# Patient Record
Sex: MALE | Race: WHITE | NOT HISPANIC OR LATINO | Employment: FULL TIME | URBAN - METROPOLITAN AREA
[De-identification: names, ages, dates, MRNs, and addresses within clinical notes are randomized per-mention and may not be internally consistent; named-entity substitution may affect disease eponyms.]

---

## 2017-06-30 ENCOUNTER — AMBULATORY - HEALTHEAST (OUTPATIENT)
Dept: NEUROSURGERY | Facility: CLINIC | Age: 37
End: 2017-06-30

## 2017-06-30 DIAGNOSIS — G93.5 CHIARI I MALFORMATION (H): ICD-10-CM

## 2017-07-19 ENCOUNTER — RECORDS - HEALTHEAST (OUTPATIENT)
Dept: ADMINISTRATIVE | Facility: OTHER | Age: 37
End: 2017-07-19

## 2017-08-22 ENCOUNTER — OFFICE VISIT - HEALTHEAST (OUTPATIENT)
Dept: NEUROSURGERY | Facility: CLINIC | Age: 37
End: 2017-08-22

## 2017-08-22 ENCOUNTER — RECORDS - HEALTHEAST (OUTPATIENT)
Dept: RADIOLOGY | Facility: CLINIC | Age: 37
End: 2017-08-22

## 2017-08-22 DIAGNOSIS — G43.909 MIGRAINE: ICD-10-CM

## 2017-08-22 ASSESSMENT — MIFFLIN-ST. JEOR: SCORE: 2180.85

## 2018-03-22 ENCOUNTER — OFFICE VISIT - HEALTHEAST (OUTPATIENT)
Dept: CARDIOLOGY | Facility: CLINIC | Age: 38
End: 2018-03-22

## 2018-03-22 DIAGNOSIS — I10 HTN (HYPERTENSION): ICD-10-CM

## 2018-03-22 DIAGNOSIS — R07.89 CHEST PRESSURE: ICD-10-CM

## 2018-03-22 ASSESSMENT — MIFFLIN-ST. JEOR: SCORE: 2126.42

## 2018-03-28 ENCOUNTER — HOSPITAL ENCOUNTER (OUTPATIENT)
Dept: NUCLEAR MEDICINE | Facility: HOSPITAL | Age: 38
Discharge: HOME | End: 2018-03-28
Attending: INTERNAL MEDICINE

## 2018-03-28 ENCOUNTER — HOSPITAL ENCOUNTER (OUTPATIENT)
Dept: CARDIOLOGY | Facility: HOSPITAL | Age: 38
Discharge: HOME | End: 2018-03-28
Attending: INTERNAL MEDICINE

## 2018-03-28 DIAGNOSIS — R07.89 CHEST PRESSURE: ICD-10-CM

## 2018-03-28 DIAGNOSIS — I10 HTN (HYPERTENSION): ICD-10-CM

## 2018-03-28 LAB
CV STRESS CURRENT BP HE: NORMAL
CV STRESS CURRENT HR HE: 101
CV STRESS CURRENT HR HE: 106
CV STRESS CURRENT HR HE: 111
CV STRESS CURRENT HR HE: 76
CV STRESS CURRENT HR HE: 80
CV STRESS CURRENT HR HE: 83
CV STRESS CURRENT HR HE: 86
CV STRESS CURRENT HR HE: 86
CV STRESS CURRENT HR HE: 91
CV STRESS CURRENT HR HE: 91
CV STRESS CURRENT HR HE: 92
CV STRESS CURRENT HR HE: 95
CV STRESS CURRENT HR HE: 97
CV STRESS DEVIATION TIME HE: NORMAL
CV STRESS ECHO PERCENT HR HE: NORMAL
CV STRESS EXERCISE STAGE HE: NORMAL
CV STRESS FINAL RESTING BP HE: NORMAL
CV STRESS FINAL RESTING HR HE: 91
CV STRESS MAX HR HE: 122
CV STRESS MAX TREADMILL GRADE HE: 0
CV STRESS MAX TREADMILL SPEED HE: 0
CV STRESS PEAK DIA BP HE: NORMAL
CV STRESS PEAK SYS BP HE: NORMAL
CV STRESS PHASE HE: NORMAL
CV STRESS PROTOCOL HE: NORMAL
CV STRESS RESTING PT POSITION HE: NORMAL
CV STRESS ST DEVIATION AMOUNT HE: NORMAL
CV STRESS ST DEVIATION ELEVATION HE: NORMAL
CV STRESS ST EVELATION AMOUNT HE: NORMAL
CV STRESS TEST TYPE HE: NORMAL
CV STRESS TOTAL STAGE TIME MIN 1 HE: NORMAL
NUC STRESS EJECTION FRACTION: 57 %
STRESS ECHO BASELINE BP: NORMAL
STRESS ECHO BASELINE HR: 76
STRESS ECHO CALCULATED PERCENT HR: 67 %
STRESS ECHO LAST STRESS BP: NORMAL
STRESS ECHO LAST STRESS HR: 91

## 2018-12-21 ENCOUNTER — RECORDS - HEALTHEAST (OUTPATIENT)
Dept: ADMINISTRATIVE | Facility: OTHER | Age: 38
End: 2018-12-21

## 2020-07-20 ENCOUNTER — TELEPHONE (OUTPATIENT)
Dept: NEUROLOGY | Facility: CLINIC | Age: 40
End: 2020-07-20

## 2020-07-20 DIAGNOSIS — G43.009 MIGRAINE WITHOUT AURA AND WITHOUT STATUS MIGRAINOSUS, NOT INTRACTABLE: Primary | ICD-10-CM

## 2020-07-20 NOTE — TELEPHONE ENCOUNTER
Please advise   Kenneth Chavez on 7/20/2020 at 11:38 AM    Patient was last seen in December 2018 a year and a half ago  Patient should set up follow-up video follow-up is okay    If still having a prolonged headache that he cannot break could use a short course of prednisone    I will send a dose of prednisone to his pharmacy  Prednisone 10 mg tablet  1 tablet p.o. daily x3 days to help break migraine headache.

## 2020-07-20 NOTE — TELEPHONE ENCOUNTER
Pt left msg stating he has a migraine that he can't get rid off. He'd had for a day and half. He's requesting a call back.

## 2020-08-03 RX ORDER — PREDNISONE 10 MG/1
10 TABLET ORAL DAILY
Qty: 3 TABLET | Refills: 0 | Status: SHIPPED | OUTPATIENT
Start: 2020-08-03 | End: 2020-11-10

## 2020-08-03 NOTE — TELEPHONE ENCOUNTER
Spoke with patient and relayed the below message. He has appt for 8/10/2020 and will be doing video visit   Kenneth Chavez on 8/3/2020 at 12:44 PM

## 2020-08-10 ENCOUNTER — OFFICE VISIT (OUTPATIENT)
Dept: NEUROLOGY | Facility: CLINIC | Age: 40
End: 2020-08-10
Payer: COMMERCIAL

## 2020-08-10 VITALS — BODY MASS INDEX: 29.83 KG/M2 | HEIGHT: 76 IN | WEIGHT: 245 LBS

## 2020-08-10 DIAGNOSIS — G43.009 MIGRAINE WITHOUT AURA AND WITHOUT STATUS MIGRAINOSUS, NOT INTRACTABLE: Primary | ICD-10-CM

## 2020-08-10 PROBLEM — H72.91 TYMPANIC MEMBRANE PERFORATION, RIGHT: Status: ACTIVE | Noted: 2020-03-11

## 2020-08-10 PROBLEM — Z87.891 HISTORY OF TOBACCO ABUSE: Status: ACTIVE | Noted: 2018-06-28

## 2020-08-10 PROBLEM — Z83.49 FAMILY HISTORY OF THYROID DISEASE: Status: ACTIVE | Noted: 2017-04-28

## 2020-08-10 PROBLEM — I10 HYPERTENSION: Status: ACTIVE | Noted: 2020-08-10

## 2020-08-10 PROBLEM — E66.9 OBESITY: Status: ACTIVE | Noted: 2020-08-10

## 2020-08-10 PROBLEM — I10 ESSENTIAL HYPERTENSION: Status: ACTIVE | Noted: 2017-09-15

## 2020-08-10 PROBLEM — D29.4 ANGIOKERATOMA OF SCROTUM: Status: ACTIVE | Noted: 2018-03-28

## 2020-08-10 PROBLEM — F32.89 ATYPICAL DEPRESSIVE DISORDER: Status: ACTIVE | Noted: 2018-03-02

## 2020-08-10 PROBLEM — R51.9 CHRONIC DAILY HEADACHE: Status: ACTIVE | Noted: 2020-08-05

## 2020-08-10 PROBLEM — M51.27 HERNIATED NUCLEUS PULPOSUS, L5-S1: Status: ACTIVE | Noted: 2020-03-05

## 2020-08-10 PROCEDURE — 99214 OFFICE O/P EST MOD 30 MIN: CPT | Mod: GT | Performed by: PSYCHIATRY & NEUROLOGY

## 2020-08-10 RX ORDER — OMEGA-3-ACID ETHYL ESTERS 1 G/1
2 CAPSULE, LIQUID FILLED ORAL DAILY
COMMUNITY

## 2020-08-10 RX ORDER — CYCLOBENZAPRINE HCL 10 MG
1 TABLET ORAL PRN
COMMUNITY
Start: 2019-04-11 | End: 2020-11-10

## 2020-08-10 RX ORDER — ALBUTEROL SULFATE 90 UG/1
1-2 AEROSOL, METERED RESPIRATORY (INHALATION)
COMMUNITY
Start: 2020-05-21

## 2020-08-10 RX ORDER — HYDROXYZINE HYDROCHLORIDE 25 MG/1
25-75 TABLET, FILM COATED ORAL SEE ADMIN INSTRUCTIONS
COMMUNITY
Start: 2020-07-21 | End: 2022-09-12

## 2020-08-10 RX ORDER — SUMATRIPTAN 100 MG/1
100 TABLET, FILM COATED ORAL PRN
COMMUNITY
Start: 2020-03-30 | End: 2020-08-10

## 2020-08-10 RX ORDER — NORTRIPTYLINE HCL 10 MG
40 CAPSULE ORAL SEE ADMIN INSTRUCTIONS
Qty: 360 CAPSULE | Refills: 11 | Status: SHIPPED | OUTPATIENT
Start: 2020-08-10 | End: 2021-07-05

## 2020-08-10 RX ORDER — SUMATRIPTAN 100 MG/1
100 TABLET, FILM COATED ORAL PRN
Qty: 9 TABLET | Refills: 11 | Status: SHIPPED | OUTPATIENT
Start: 2020-08-10 | End: 2021-07-05

## 2020-08-10 RX ORDER — DULOXETIN HYDROCHLORIDE 30 MG/1
30 CAPSULE, DELAYED RELEASE ORAL DAILY
COMMUNITY
Start: 2018-12-18 | End: 2021-07-05

## 2020-08-10 RX ORDER — LORATADINE 10 MG/1
10 TABLET ORAL DAILY
COMMUNITY

## 2020-08-10 RX ORDER — NORTRIPTYLINE HCL 10 MG
10-30 CAPSULE ORAL SEE ADMIN INSTRUCTIONS
COMMUNITY
Start: 2019-12-23 | End: 2020-08-10

## 2020-08-10 RX ORDER — ATENOLOL 25 MG/1
1 TABLET ORAL AT BEDTIME
COMMUNITY
Start: 2020-07-30 | End: 2022-09-12

## 2020-08-10 RX ORDER — DEXAMETHASONE 4 MG/1
1 TABLET ORAL
COMMUNITY
Start: 2020-05-21 | End: 2022-09-12

## 2020-08-10 ASSESSMENT — MIFFLIN-ST. JEOR: SCORE: 2117.81

## 2020-08-10 NOTE — LETTER
"    8/10/2020         RE: Klaus Seaman  [address] [place] [facility] [address]        Dear Colleague,    Thank you for referring your patient, Klaus Seaman, to the [facility] [place]. Please see a copy of my visit note below.    Video follow-up requested by patient  Video follow-up due to the COVID-19 pandemic  Text number [phone]  [employer]  Patient identified    Amwell did not connect well patient was on his cell phone trying to connect    .Patient is being evaluated via a billable video visit. The patient has been notified of following:     \"This video visit will be conducted via a call between you and your physician/provider. We have found that certain health care needs can be provided without the need for an in-person physical exam. This service lets us provide the care you need with a video conversation. If a prescription is necessary we can send it directly to your pharmacy. If lab work is needed we can place an order for that and you can then stop by our lab to have the test done at a later time.    If during the course of the call the physician/provider feels a video visit is not appropriate, you will not be charged for this service.    Physician has received verbal consent for a Video Visit from the patient? YES    Patient would like the video invitation sent by: Text number [phone]    Video Visit Details    Type of service: Video Visit    Video Start Time: 1:30 PM    Video End Time (time video stopped): 2 PM    Originating Location (pt. Location): Patient's Home    Distant Location (provider location): [facility]     Mode of Communication: Video Conference via Rico/ [employer]               Klaus Seaman, a 40-year-old  Reevaluation of chronic migraine headaches  Patient last seen greater than 20 months ago      Patient previously was on metoprolol but had been taken off of that  This summer headache seems of gotten much more severe    Headache " profile  Frequency, headache every day  Duration can last most of the day  Intensity, average from a mild at 1-3 out of 10 mostly, to occasionally been severe 10 out of 10  Missed 2 days of work maybe a week or 2 ago      Had a short prednisone burst that helped but when he was off of it headaches came back    Patient states that he was taken off metoprolol because the blood pressure was elevated just because of the stress    Primary is placed him back on a low-dose of atenolol to see if that would help with his headaches    He does have some asthma and uses inhalers but those are not exacerbated with the beta-blocker    Patient previously was using maybe 1-2 Imitrex per month now was using at least 4 Imitrex per month    Patient feels that when he is having a headache regularly he has difficulty with thinking may be stuttering    Patient states that he has not had this bad a headache since he was a kid maybe 13 or so years old next  With bad headaches he does get photophobia phonophobia hypersensitivity to smell some nausea usually does not have visual changes and does not throw up as trouble concentrating            Neurologic follow-up for:    1. Common migraine.  2. Onset of migraines at the age of 10 to 11 years old.  3. History of getting headaches maybe every five to six times per year in the past.  4. Flurry of headaches for about two weeks, last time I saw him.              No stroke, heart disease, high cholesterol, hypertension, or diabetes in the family.    Family history is also positive for  Maternal grandfather had headaches  Son with Arnold-Chiari malformation  Paternal grandmother with Parkinson s  Mother with arthritis and depression  Father with arthritis        Habits  Patient does smoke some,   Does not drink alcohol.    Medical history:    1. Hypertension, on metoprolol.  2. Common migraine.  3.          Anxiety  4.         Back surgery lumbar sciatica on the right side March 5, 2020  5.          Tympanoplasty  6.         Asthma      Workup in the past included:    1. MRI scan of the head on July 14, 2017, subtle T2 signal changes, otherwise unremarkable similar to 2013, slight increase  in the T2 signal changes.  2. CTA of the head and neck on November 2016, unremarkable.    Patient states that as a teenager had started having headaches, has used sumatriptan in the past, has used Midrin in the past when he was young.    With the bad headaches, he does get photophobia, some phonophobia, and hypersensitive to smells and some nausea, no visual changes, no vomiting.      Review of systems  Frequent headaches as above  Photophobia and phonophobia with the headaches  Not much visual changes  Some difficulty with stuttering of speech when the headaches are bad  No focal weakness  Diplopia dysarthria dysphasia  Difficulty with walking    No chest pain no shortness of breath no nausea vomiting no diarrhea no fever chills no cough no sore throat    Otherwise review systems negative    General exam  HEENT normal  Moving air well not wheezing  Abdomen soft  No edema the feet      Neurologic exam  Alert oriented x3  Normal naming  Normal comprehension  Normal prosody of speech  Normal repetition  No aphasia  No neglect  Normal memory recall    Cranial nerves II through XII  No ophthalmoplegia or nystagmus  Visual fields are intact  Face is symmetrical  Tongue twisters are good    Upper extremities  No drift no tremor normal finger-nose    Lower extremities  Normal strength    Gait  Normal gait    Exam is stable                    We talked about the difference between abortive and preventative medication. We talked about serotonin syndrome, as he is on some medication for his anxiety, nortriptyline, and Imitrex.    Current plan:    1.         Imitrex 100 mg tablet, one tablet p.o. q. day p.r.n. for severe headaches #9 with 11 refills.  2. Primary switched him to atenolol small dose for hypertension, but may help  headache also.  (Seem to do better when he was on the metoprolol 50 mg/day)  3. Nortriptyline, will increase the dose to 40 mg at nighttime using 10 mg tablets  4. Previously tried Topamax, but it made him feel weird.  5. Will follow up in 3 months  6. Talked about good headache hygiene with proper hydration, sleep cycle, eating, and avoiding triggers.    Patient had some response to a short burst of 3 days of prednisone concerned that if this is used too often though he will lose effectiveness    Median a chronic headache rebound phenomenon also we discussed that also    25 minutes total care time today greater than 50% face-to-face with patient discussing above treatment and recommendations      Again, thank you for allowing me to participate in the care of your patient.        Sincerely,        Yandel Ledesma MD

## 2020-08-10 NOTE — NURSING NOTE
Chief Complaint   Patient presents with     Follow Up     Migraines- states he was doing well up until the last month and a half. Since then there has been an increase in frequency where he is getting them more daily and are lasting almost constant. the prednisone burst helped but once he completed the course, the headaches came back      Smart phone [phone] ([employer])   Kenneth Chavez on 8/10/2020 at 1:32 PM

## 2020-08-10 NOTE — PROGRESS NOTES
"Video follow-up requested by patient  Video follow-up due to the COVID-19 pandemic  Text number [phone]  [employer]Peer  Patient identified    Amwell did not connect well patient was on his cell phone trying to connect    .Patient is being evaluated via a billable video visit. The patient has been notified of following:     \"This video visit will be conducted via a call between you and your physician/provider. We have found that certain health care needs can be provided without the need for an in-person physical exam. This service lets us provide the care you need with a video conversation. If a prescription is necessary we can send it directly to your pharmacy. If lab work is needed we can place an order for that and you can then stop by our lab to have the test done at a later time.    If during the course of the call the physician/provider feels a video visit is not appropriate, you will not be charged for this service.    Physician has received verbal consent for a Video Visit from the patient? YES    Patient would like the video invitation sent by: Text number [phone]    Video Visit Details    Type of service: Video Visit    Video Start Time: 1:30 PM    Video End Time (time video stopped): 2 PM    Originating Location (pt. Location): Patient's Home    Distant Location (provider location): [facility] [place]     Mode of Communication: Video Conference via Rico/ [employer] Elianirudh Seaman, a 40-year-old  Reevaluation of chronic migraine headaches  Patient last seen greater than 20 months ago      Patient previously was on metoprolol but had been taken off of that  This summer headache seems of gotten much more severe    Headache profile  Frequency, headache every day  Duration can last most of the day  Intensity, average from a mild at 1-3 out of 10 mostly, to occasionally been severe 10 out of 10  Missed 2 days of work maybe a week or 2 ago      Had a short prednisone burst " that helped but when he was off of it headaches came back    Patient states that he was taken off metoprolol because the blood pressure was elevated just because of the stress    Primary is placed him back on a low-dose of atenolol to see if that would help with his headaches    He does have some asthma and uses inhalers but those are not exacerbated with the beta-blocker    Patient previously was using maybe 1-2 Imitrex per month now was using at least 4 Imitrex per month    Patient feels that when he is having a headache regularly he has difficulty with thinking may be stuttering    Patient states that he has not had this bad a headache since he was a kid maybe 13 or so years old next  With bad headaches he does get photophobia phonophobia hypersensitivity to smell some nausea usually does not have visual changes and does not throw up as trouble concentrating            Neurologic follow-up for:    1. Common migraine.  2. Onset of migraines at the age of 10 to 11 years old.  3. History of getting headaches maybe every five to six times per year in the past.  4. Flurry of headaches for about two weeks, last time I saw him.              No stroke, heart disease, high cholesterol, hypertension, or diabetes in the family.    Family history is also positive for  Maternal grandfather had headaches  Son with Arnold-Chiari malformation  Paternal grandmother with Parkinson s  Mother with arthritis and depression  Father with arthritis        Habits  Patient does smoke some,   Does not drink alcohol.    Medical history:    1. Hypertension, on metoprolol.  2. Common migraine.  3.          Anxiety  4.         Back surgery lumbar sciatica on the right side March 5, 2020  5.         Tympanoplasty  6.         Asthma      Workup in the past included:    1. MRI scan of the head on July 14, 2017, subtle T2 signal changes, otherwise unremarkable similar to 2013, slight increase  in the T2 signal changes.  2. CTA of the head and neck  on November 2016, unremarkable.    Patient states that as a teenager had started having headaches, has used sumatriptan in the past, has used Midrin in the past when he was young.    With the bad headaches, he does get photophobia, some phonophobia, and hypersensitive to smells and some nausea, no visual changes, no vomiting.      Review of systems  Frequent headaches as above  Photophobia and phonophobia with the headaches  Not much visual changes  Some difficulty with stuttering of speech when the headaches are bad  No focal weakness  Diplopia dysarthria dysphasia  Difficulty with walking    No chest pain no shortness of breath no nausea vomiting no diarrhea no fever chills no cough no sore throat    Otherwise review systems negative    General exam  HEENT normal  Moving air well not wheezing  Abdomen soft  No edema the feet      Neurologic exam  Alert oriented x3  Normal naming  Normal comprehension  Normal prosody of speech  Normal repetition  No aphasia  No neglect  Normal memory recall    Cranial nerves II through XII  No ophthalmoplegia or nystagmus  Visual fields are intact  Face is symmetrical  Tongue twisters are good    Upper extremities  No drift no tremor normal finger-nose    Lower extremities  Normal strength    Gait  Normal gait    Exam is stable                    We talked about the difference between abortive and preventative medication. We talked about serotonin syndrome, as he is on some medication for his anxiety, nortriptyline, and Imitrex.    Current plan:    1.         Imitrex 100 mg tablet, one tablet p.o. q. day p.r.n. for severe headaches #9 with 11 refills.  2. Primary switched him to atenolol small dose for hypertension, but may help headache also.  (Seem to do better when he was on the metoprolol 50 mg/day)  3. Nortriptyline, will increase the dose to 40 mg at nighttime using 10 mg tablets  4. Previously tried Topamax, but it made him feel weird.  5. Will follow up in 3  months  6. Talked about good headache hygiene with proper hydration, sleep cycle, eating, and avoiding triggers.    Patient had some response to a short burst of 3 days of prednisone concerned that if this is used too often though he will lose effectiveness    Median a chronic headache rebound phenomenon also we discussed that also    25 minutes total care time today greater than 50% face-to-face with patient discussing above treatment and recommendations

## 2020-08-10 NOTE — PATIENT INSTRUCTIONS
Patient Education     About Migraine Headaches  What is a migraine headache?  A migraine is a special kind of headache. It can last a for hours or days. It may cause intense pain. You may also feel sick to your stomach or have eye problems.  What causes it?  We don't know the exact cause. They may be caused by a problem with blood flow to the brain. Or they may happen when brain chemicals don't stay balanced. You are more likely to get a migraine when:    You are under stress    You are tired    You eat some kinds of foods, such as wine, cheese, chocolate or chemicals added to foods    You are around bright lights  Women are more likely to have migraines than men. Sometimes the headaches happen around the time a woman has her period. Or they may happen when a woman is taking hormone pills.   Migraines can run in families.  What are symptoms?  Before a migraine, you may:    Not feel well    Lose part of your vision    See bright spots    See zigzags in front of your eyes  Most of the time, these problems go away when the headache starts. When you have a migraine, you may:    Have a headache that throbs or pounds (you may feel the pain more on one side of your head, or your whole head may hurt)    Be very sensitive to light    Have blurry vision    Vomit (throw up) or have nausea (feel sick to your stomach)    Have numbness or tingling in your face or arm  How is it diagnosed?  Your care team will ask you about your symptoms and medical history. They will examine you.   It may help to keep a headache diary. You should write down:    The date and time of each headache    How long it lasts    The type of pain (is it dull or sharp? Does it throb? Do you feel pressure?)    Where it hurts (for example, scalp, eyes, temples, neck)    What symptoms you had before the headache started    What you ate and drank before the headache    If you used cigarettes, caffeine, alcohol or soda    What time you went to bed the night  before, and what time you woke up that day    If you are a woman, you should also note if you are using birth control pills or taking other female hormones  If you just recently started having headaches, your care team may suggest tests to look for the causes of your symptoms. You may need a brain scan or MRI.  How is it treated?    You may need to take medicines to keep migraines from getting worse once they start. Take these medicines as soon as you can when you start to have signs of a migraine.    You may need to take another medicine every day to stop migraines from coming so often. The medicine can help prevent very bad migraines.    You may need to try a medicine for a few weeks to see if it works. Be sure to keep your follow-up appointments with your care team to see if a medicine is working and what you should try next. Talk to your care team about what is best for you. You may have many choices.  How long will it last?  The headache may last from a few hours to a few days. You may get migraines for the rest of your life. Most of the time, migraines happen less often as you get older.  How can I take care of myself?  As soon as the migraine starts:    Take a pain reliever. Ask your care team what medicine you should take.    Rest in a quiet, dark room until you start to feel better.    Don't drive a car while you have a migraine.    See your care team if your headaches get worse or if they don't get better when you take medicine for them. It may take a few visits to find the best way to control your headaches.  How can I prevent migraines?    Eat regular, healthy meals. Don't go too long without eating. Stay away from foods that seem to cause headaches. Watch out for:  ? Wine, yenni and beer  ? Cheese  ? Aged, canned and processed meats  ? Bread made with yeast  ? Foods with cheese, chocolate or nuts    Don't use medicines that can cause headaches. Ask your care team about this. You may need to stop using  birth control or hormone pills.    Don't smoke cigarettes    Get plenty of sleep every day    Lower your stress. Find time to relax, rest and have fun in your life.  When should I call my care team?  Call your care team right away if you have symptoms that you don't usually get with migraines or you have other unusual symptoms, such as:    Problems talking or your speech is slurred    A weak arm or leg that you can't move in a normal way    A fever higher than 100 F (37.8 C)    Stiff neck    Vomiting that lasts for a few hours  For more information  National Headache Foundation (NHF)  www.headaches.org.  For informational purposes only. Not to replace the advice of your health care provider.   Copyright   2011 [employer]. All rights reserved. Clinically reviewed by [employer]. Present 213831 - 08/18.  For informational purposes only. Not to replace the advice of your health care provider.  Copyright   2018 [employer]. All rights reserved.

## 2020-11-10 ENCOUNTER — VIRTUAL VISIT (OUTPATIENT)
Dept: NEUROLOGY | Facility: CLINIC | Age: 40
End: 2020-11-10
Payer: COMMERCIAL

## 2020-11-10 VITALS — BODY MASS INDEX: 29.83 KG/M2 | HEIGHT: 76 IN | WEIGHT: 245 LBS

## 2020-11-10 DIAGNOSIS — G43.709 CHRONIC MIGRAINE WITHOUT AURA WITHOUT STATUS MIGRAINOSUS, NOT INTRACTABLE: Primary | ICD-10-CM

## 2020-11-10 DIAGNOSIS — H72.91 TYMPANIC MEMBRANE PERFORATION, RIGHT: ICD-10-CM

## 2020-11-10 DIAGNOSIS — R51.9 CHRONIC DAILY HEADACHE: ICD-10-CM

## 2020-11-10 PROCEDURE — 99213 OFFICE O/P EST LOW 20 MIN: CPT | Mod: 95 | Performed by: PSYCHIATRY & NEUROLOGY

## 2020-11-10 ASSESSMENT — MIFFLIN-ST. JEOR: SCORE: 2117.81

## 2020-11-10 NOTE — PROGRESS NOTES
"Video follow-up requested by patient  Video follow-up due to the COVID-19 pandemic  Text number [phone]  [email or web address]  Patient identified    Amwell did not connect well patient was on his cell phone trying to connect    .Patient is being evaluated via a billable video visit. The patient has been notified of following:     \"This video visit will be conducted via a call between you and your physician/provider. We have found that certain health care needs can be provided without the need for an in-person physical exam. This service lets us provide the care you need with a video conversation. If a prescription is necessary we can send it directly to your pharmacy. If lab work is needed we can place an order for that and you can then stop by our lab to have the test done at a later time.    If during the course of the call the physician/provider feels a video visit is not appropriate, you will not be charged for this service.    Physician has received verbal consent for a Video Visit from the patient? YES    Patient would like the video invitation sent by: Text number [phone]    Video Visit Details    Type of service: Video Visit    Video Start Time: 3 PM     video end time: 3:15 PM    Originating Location (pt. Location): Patient's Home    Distant Location (provider location): [facility] [place]     Mode of Communication: Video Conference via Rico/ Lisset CERVANTES   Klaus Seaman, a 40-year-old  Reevaluation of chronic migraine headaches  Last seen 3 months ago increased his nortriptyline  Doing better  Over the last 3 months healthy 2 headaches  One was maybe half a day the other one was shorter  Imitrex seems to work  Getting good sleep    Did see the ENT doctor has a cyst in his external ear canal on the right is can have surgery November 23  He has had other cysts in the past    Previously patient had been on metoprolol and did better but came off of that earlier in the year and " headaches flared up and we readjusted the nortriptyline as above      Headache profile back in summer 2020  Frequency, headache every day  Duration can last most of the day  Intensity, average from a mild at 1-3 out of 10 mostly, to occasionally been severe 10 out of 10  Missed 2 days of work maybe a week or 2 ago  Had a short prednisone burst that helped but when he was off of it headaches came back  Patient states that he was taken off metoprolol because the blood pressure was elevated just because of the stress  Primary is placed him back on a low-dose of atenolol to see if that would help with his headaches  He does have some asthma and uses inhalers but those are not exacerbated with the beta-blocker  Patient previously was using maybe 1-2 Imitrex per month now was using at least 4 Imitrex per month  Patient feels that when he is having a headache regularly he has difficulty with thinking may be stuttering  Patient states that he has not had this bad a headache since he was a kid maybe 13 or so years old next  With bad headaches he does get photophobia phonophobia hypersensitivity to smell some nausea usually does not have visual changes and does not throw up as trouble concentrating            Neurologic follow-up for:    1. Common migraine.  2. Onset of migraines at the age of 10 to 11 years old.  3. History of getting headaches maybe every five to six times per year in the past.  4. Flurry of headaches for about two weeks, last time I saw him.              No stroke, heart disease, high cholesterol, hypertension, or diabetes in the family.    Family history is also positive for  Maternal grandfather had headaches  Son with Arnold-Chiari malformation  Paternal grandmother with Parkinson s  Mother with arthritis and depression  Father with arthritis        Habits  Patient does smoke some,   Does not drink alcohol.    Medical history:    1. Hypertension, on metoprolol.  2. Common migraine.  3.           Anxiety  4.         Back surgery lumbar sciatica on the right side March 5, 2020  5.         Tympanoplasty  6.         Asthma      Workup in the past included:    1. MRI scan of the head on July 14, 2017, subtle T2 signal changes, otherwise unremarkable similar to 2013, slight increase  in the T2 signal changes.  2. CTA of the head and neck on November 2016, unremarkable.    Patient states that as a teenager had started having headaches, has used sumatriptan in the past, has used Midrin in the past when he was young.    With the bad headaches, he does get photophobia, some phonophobia, and hypersensitive to smells and some nausea, no visual changes, no vomiting.      Review of systems  Frequent headaches as above  Photophobia and phonophobia with the headaches  Not much visual changes  Some difficulty with stuttering of speech when the headaches are bad  No focal weakness  Diplopia dysarthria dysphasia  Difficulty with walking    No chest pain no shortness of breath no nausea vomiting no diarrhea no fever chills no cough no sore throat    Otherwise review systems negative    General exam  HEENT normal  Moving air well not wheezing  Abdomen soft  No edema the feet      Neurologic exam  Alert oriented x3  Normal naming  Normal comprehension  Normal prosody of speech  Normal repetition  No aphasia  No neglect  Normal memory recall    Cranial nerves II through XII  No ophthalmoplegia or nystagmus  Visual fields are intact  Face is symmetrical  Tongue twisters are good    Upper extremities  No drift no tremor normal finger-nose    Lower extremities  Normal strength    Gait  Normal gait    Exam stable                  We discussed  We talked about the difference between abortive and preventative medication. We talked about serotonin syndrome, as he is on some medication for his anxiety, nortriptyline, and Imitrex.    Current plan:    1.         Imitrex 100 mg tablet, one tablet p.o. q. day p.r.n. for severe headaches #9  with 11 refills.  2. Primary switched him to atenolol small dose for hypertension, but may help headache also.  (Seem to do better when he was on the metoprolol 50 mg/day)  3. Nortriptyline, previous visit dose increased to 40 mg at nighttime using 10 mg tablets.  4. Previously tried Topamax, but it made him feel weird.  5. Will follow up in 6 months  6. Talked about good headache hygiene with proper hydration, sleep cycle, eating, and avoiding triggers.    Patient had some response to a short burst of 3 days of prednisone concerned that if this is used too often though he will lose effectiveness    Doing better now    15 minutes discussion time as above  Follow-up in 6 months      25 minutes total care time today greater than 50% face-to-face with patient discussing above treatment and recommendations

## 2020-11-10 NOTE — LETTER
"    11/10/2020         RE: Klaus Seaman  [address]  [place] [address]        Dear Colleague,    Thank you for referring your patient, Klaus Seaman, to the [facility] [place]. Please see a copy of my visit note below.    Video follow-up requested by patient  Video follow-up due to the COVID-19 pandemic  Text number [phone]  [facility]  Patient identified    Amwell did not connect well patient was on his cell phone trying to connect    .Patient is being evaluated via a billable video visit. The patient has been notified of following:     \"This video visit will be conducted via a call between you and your physician/provider. We have found that certain health care needs can be provided without the need for an in-person physical exam. This service lets us provide the care you need with a video conversation. If a prescription is necessary we can send it directly to your pharmacy. If lab work is needed we can place an order for that and you can then stop by our lab to have the test done at a later time.    If during the course of the call the physician/provider feels a video visit is not appropriate, you will not be charged for this service.    Physician has received verbal consent for a Video Visit from the patient? YES    Patient would like the video invitation sent by: Text number [phone]    Video Visit Details    Type of service: Video Visit    Video Start Time: 3 PM     video end time: 3:15 PM    Originating Location (pt. Location): Patient's Home    Distant Location (provider location): [facility] [place]     Mode of Communication: Video Conference via Rico/ L[employer]   Klaus Seaman, a 40-year-old  Reevaluation of chronic migraine headaches  Last seen 3 months ago increased his nortriptyline  Doing better  Over the last 3 months healthy 2 headaches  One was maybe half a day the other one was shorter  Imitrex seems to work  Getting good " sleep    Did see the ENT doctor has a cyst in his external ear canal on the right is can have surgery November 23  He has had other cysts in the past    Previously patient had been on metoprolol and did better but came off of that earlier in the year and headaches flared up and we readjusted the nortriptyline as above      Headache profile back in summer 2020  Frequency, headache every day  Duration can last most of the day  Intensity, average from a mild at 1-3 out of 10 mostly, to occasionally been severe 10 out of 10  Missed 2 days of work maybe a week or 2 ago  Had a short prednisone burst that helped but when he was off of it headaches came back  Patient states that he was taken off metoprolol because the blood pressure was elevated just because of the stress  Primary is placed him back on a low-dose of atenolol to see if that would help with his headaches  He does have some asthma and uses inhalers but those are not exacerbated with the beta-blocker  Patient previously was using maybe 1-2 Imitrex per month now was using at least 4 Imitrex per month  Patient feels that when he is having a headache regularly he has difficulty with thinking may be stuttering  Patient states that he has not had this bad a headache since he was a kid maybe 13 or so years old next  With bad headaches he does get photophobia phonophobia hypersensitivity to smell some nausea usually does not have visual changes and does not throw up as trouble concentrating            Neurologic follow-up for:    1. Common migraine.  2. Onset of migraines at the age of 10 to 11 years old.  3. History of getting headaches maybe every five to six times per year in the past.  4. Flurry of headaches for about two weeks, last time I saw him.              No stroke, heart disease, high cholesterol, hypertension, or diabetes in the family.    Family history is also positive for  Maternal grandfather had headaches  Son with Arnold-Chiari  malformation  Paternal grandmother with Parkinson s  Mother with arthritis and depression  Father with arthritis        Habits  Patient does smoke some,   Does not drink alcohol.    Medical history:    1. Hypertension, on metoprolol.  2. Common migraine.  3.          Anxiety  4.         Back surgery lumbar sciatica on the right side March 5, 2020  5.         Tympanoplasty  6.         Asthma      Workup in the past included:    1. MRI scan of the head on July 14, 2017, subtle T2 signal changes, otherwise unremarkable similar to 2013, slight increase  in the T2 signal changes.  2. CTA of the head and neck on November 2016, unremarkable.    Patient states that as a teenager had started having headaches, has used sumatriptan in the past, has used Midrin in the past when he was young.    With the bad headaches, he does get photophobia, some phonophobia, and hypersensitive to smells and some nausea, no visual changes, no vomiting.      Review of systems  Frequent headaches as above  Photophobia and phonophobia with the headaches  Not much visual changes  Some difficulty with stuttering of speech when the headaches are bad  No focal weakness  Diplopia dysarthria dysphasia  Difficulty with walking    No chest pain no shortness of breath no nausea vomiting no diarrhea no fever chills no cough no sore throat    Otherwise review systems negative    General exam  HEENT normal  Moving air well not wheezing  Abdomen soft  No edema the feet      Neurologic exam  Alert oriented x3  Normal naming  Normal comprehension  Normal prosody of speech  Normal repetition  No aphasia  No neglect  Normal memory recall    Cranial nerves II through XII  No ophthalmoplegia or nystagmus  Visual fields are intact  Face is symmetrical  Tongue twisters are good    Upper extremities  No drift no tremor normal finger-nose    Lower extremities  Normal strength    Gait  Normal gait    Exam stable                  We discussed  We talked about the  difference between abortive and preventative medication. We talked about serotonin syndrome, as he is on some medication for his anxiety, nortriptyline, and Imitrex.    Current plan:    1.         Imitrex 100 mg tablet, one tablet p.o. q. day p.r.n. for severe headaches #9 with 11 refills.  2. Primary switched him to atenolol small dose for hypertension, but may help headache also.  (Seem to do better when he was on the metoprolol 50 mg/day)  3. Nortriptyline, previous visit dose increased to 40 mg at nighttime using 10 mg tablets.  4. Previously tried Topamax, but it made him feel weird.  5. Will follow up in 6 months  6. Talked about good headache hygiene with proper hydration, sleep cycle, eating, and avoiding triggers.    Patient had some response to a short burst of 3 days of prednisone concerned that if this is used too often though he will lose effectiveness    Doing better now    15 minutes discussion time as above  Follow-up in 6 months      25 minutes total care time today greater than 50% face-to-face with patient discussing above treatment and recommendations        Again, thank you for allowing me to participate in the care of your patient.        Sincerely,        Yandel Ledesma MD

## 2021-01-15 ENCOUNTER — HEALTH MAINTENANCE LETTER (OUTPATIENT)
Age: 41
End: 2021-01-15

## 2021-02-02 ENCOUNTER — MYC MEDICAL ADVICE (OUTPATIENT)
Dept: NEUROLOGY | Facility: CLINIC | Age: 41
End: 2021-02-02

## 2021-02-02 DIAGNOSIS — G43.709 CHRONIC MIGRAINE WITHOUT AURA WITHOUT STATUS MIGRAINOSUS, NOT INTRACTABLE: Primary | ICD-10-CM

## 2021-02-02 NOTE — TELEPHONE ENCOUNTER
Dr. Ledesma-  See pt's BridgeX[employer] message and reply via BridgeXs.  Last seen on 11-10-20.  Yamilet Bueno LPN on 2/2/2021 at 8:12 AM

## 2021-02-03 RX ORDER — PREDNISONE 20 MG/1
TABLET ORAL
Qty: 5 TABLET | Refills: 0 | Status: SHIPPED | OUTPATIENT
Start: 2021-02-03 | End: 2022-09-12

## 2021-02-03 NOTE — TELEPHONE ENCOUNTER
I answered the question on my chart    Sent a prescription to the pharmacy listed in the chart    Prednisone 20 mg tablet 1 tab p.o. every morning x5 days    Yandel Ledesma MD on 2/3/2021 at 4:05 PM

## 2021-02-23 RX ORDER — DULOXETIN HYDROCHLORIDE 30 MG/1
CAPSULE, DELAYED RELEASE ORAL
Qty: 90 CAPSULE | Refills: 2 | OUTPATIENT
Start: 2021-02-23

## 2021-03-31 ENCOUNTER — IMMUNIZATION (OUTPATIENT)
Dept: NURSING | Facility: CLINIC | Age: 41
End: 2021-03-31
Payer: COMMERCIAL

## 2021-03-31 PROCEDURE — 91300 PR COVID VAC PFIZER DIL RECON 30 MCG/0.3 ML IM: CPT

## 2021-03-31 PROCEDURE — 0001A PR COVID VAC PFIZER DIL RECON 30 MCG/0.3 ML IM: CPT

## 2021-04-21 ENCOUNTER — IMMUNIZATION (OUTPATIENT)
Dept: NURSING | Facility: CLINIC | Age: 41
End: 2021-04-21
Attending: INTERNAL MEDICINE
Payer: COMMERCIAL

## 2021-04-21 PROCEDURE — 0002A PR COVID VAC PFIZER DIL RECON 30 MCG/0.3 ML IM: CPT

## 2021-04-21 PROCEDURE — 91300 PR COVID VAC PFIZER DIL RECON 30 MCG/0.3 ML IM: CPT

## 2021-05-25 ENCOUNTER — RECORDS - HEALTHEAST (OUTPATIENT)
Dept: ADMINISTRATIVE | Facility: CLINIC | Age: 41
End: 2021-05-25

## 2021-05-27 ENCOUNTER — RECORDS - HEALTHEAST (OUTPATIENT)
Dept: ADMINISTRATIVE | Facility: CLINIC | Age: 41
End: 2021-05-27

## 2021-05-28 ENCOUNTER — RECORDS - HEALTHEAST (OUTPATIENT)
Dept: ADMINISTRATIVE | Facility: CLINIC | Age: 41
End: 2021-05-28

## 2021-05-29 ENCOUNTER — RECORDS - HEALTHEAST (OUTPATIENT)
Dept: ADMINISTRATIVE | Facility: CLINIC | Age: 41
End: 2021-05-29

## 2021-05-31 VITALS — HEIGHT: 76 IN | WEIGHT: 260 LBS | BODY MASS INDEX: 31.66 KG/M2

## 2021-06-01 VITALS — BODY MASS INDEX: 30.2 KG/M2 | WEIGHT: 248 LBS | HEIGHT: 76 IN

## 2021-06-11 NOTE — PROGRESS NOTES
[facility] - brain MRI without contrast prior to appt in clinic with Dr. Tse.  Marquita Kruger, RN, CNRN

## 2021-06-12 NOTE — PROGRESS NOTES
HA not daily but at posterior side of head and nothing helps. HA are non positional. Pt has sensitivity to light, hearing is amplified. Speech is fluent.

## 2021-06-12 NOTE — PROGRESS NOTES
Dear Dr. Gonzales:  I had the pleasure of seeing Klaus Seaman in consultation in my neurosurgery clinic for possible Chiari malformation.  Klaus is a very pleasant right-handed 37-year-old who has a son with a Chiari malformation and he was having similar symptoms so he made an appointment to see me and had an MRI done recently.  MRIs dated 7/14/2017 and does not show any Chiari malformation.  However he does have a few T2 hyperintense foci in the white matter that are nonspecific.  He states that he has headaches that are occipital and almost once a week and they are debilitating and he has had them since high school.  On physical exam patient is in no acute distress  Face is symmetric  Speech is clear fluent and appropriate  Extraocular muscles are intact bilaterally  Tongue and uvula elevated midline  Shoulder shrug is intact bilaterally  Gait is nonantalgic  Tone and strength are appropriate and symmetric in upper and lower extremities    Assessment and plan:  Klaus Seaman very pleasant right-handed 37-year-old with likely migraines.  He does not have any evidence Chiari on his imaging.  I will refer him to my neurology colleagues for headache evaluation and management.  Thank you for giving me the opportunity to see this very pleasant patient.  If you have any questions about Klaus or any othe patients please feel free to contact me.  Of note I spent greater than 15 minutes counseling the patient regarding his pathology and treatment plan.    Jovita Tse MD, FAANS

## 2021-06-16 NOTE — PROGRESS NOTES
[facility] Heart Care Note    Assessment/Plan:    Klaus Seaman is a 37 y.o. old male with:  1. Chest pain - some features atypical but given risk factors would proceed wihtstress imaging in addition to the following:    Hold imitrex to avoid spasm    Stop metoprolol and start carvedilol (watch for shortness of breath)    Start aspirin 325mg daily cont famotadine to protect your stomach    Arrange stress nuclear study to test for severe blockages     Arrange for lipid study with blood test with Dr. Gonzales at [facility]        Dr. Jagdeep Dominguez  [facility]ARE  [phone]  ______________________________________________________________________    Subjective:    I had the opportunity to see Klaus Seaman at the [facility]. Klaus Seaman is a 37 y.o. male with a known history of stopped tob 1 year ago, no DM or elevated cholesterol , HTN on metoprolol, father with MI in late 60's, with chest pain for past week. He also has migrains and took imitrex with chest pain to follow.  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  Going thru a divorce currently with 5 children, ages 11 - 21. No GI/ bleeding, noted his 18 yo daughter has HTN.  On Sunday he had nausea and diaphoresis on Sunday, with dyspnea. Went toe hED and ruled out for MI and sent home.  Pt had normal stress echo at [facility] 5 years ago and noted to have PFO.  No hx of CVA.    ______________________________________________________________________      Review of Systems:   General: WNL  Eyes: WNL  Ears/Nose/Throat: Hearing Loss  Lungs: WNL  Heart: Chest Pain  Stomach: Heartburn  Bladder: WNL  Muscle/Joints: WNL  Skin: WNL  Nervous System: WNL  Mental Health: Depression     Blood: WNL    Problem List:  There is no problem list on file for this patient.    Medical History:  Past Medical History:   Diagnosis Date     Depression      GERD  "(gastroesophageal reflux disease)      Hypertension      Seasonal depression      Suicide attempt      Surgical History:  Past Surgical History:   Procedure Laterality Date     NASAL SEPTUM SURGERY  03/21/2017     WRIST SURGERY       Social History:  No pertinent social hx related to patient's chief complaint other than above in subjective        Family History:  No pertinent family hx related to patient's chief complaint other than above in subjective      Allergies:  Allergies   Allergen Reactions     Codeine Other (See Comments)     Unknown reaction during child campos     Opioids - Morphine Analogues Other (See Comments)     hyper     Sulfa (Sulfonamide Antibiotics) Hives     Sulfasalazine Hives     Varenicline Tartrate Other (See Comments)     Mood changes       Medications:  Current Outpatient Prescriptions   Medication Sig Dispense Refill     famotidine (PEPCID) 20 MG tablet Take 20 mg by mouth.       hydrOXYzine HCl (ATARAX) 25 MG tablet Take 25 mg by mouth at bedtime as needed.  0     loratadine (CLARITIN) 10 mg tablet Take 10 mg by mouth daily.       metoprolol succinate (TOPROL-XL) 50 MG 24 hr tablet Take 50 mg by mouth daily.  3     SUMAtriptan (IMITREX) 100 MG tablet Take 100 mg by mouth daily as needed.       VENTOLIN HFA 90 mcg/actuation inhaler Inhale 1 puff as needed.  3     amLODIPine (NORVASC) 2.5 MG tablet Take 1 tablet by mouth daily  3     butalbital-acetaminophen-caffeine (FIORICET, ESGIC) -40 mg per tablet Take by mouth.       No current facility-administered medications for this visit.        Objective:   Vital signs:  /68 (Patient Site: Left Arm, Patient Position: Sitting, Cuff Size: Adult Large)  Pulse 60  Resp 16  Ht 6' 4\" (1.93 m)  Wt (!) 248 lb (112.5 kg)  BMI 30.19 kg/m2      Physical Exam:    GENERAL APPEARANCE: Alert, cooperative and in no acute distress.  HEENT: No scleral icterus. No Xanthelasma. Oral mucuos membranes pink and moist.  NECK: JVP<6cm. No Hepatojugular " reflux. Thyroid not visualized  CHEST: clear to auscultation  CARDIOVASCULAR: S1, S2 without murmur ,clicks or rubs. Brachial, radial and posterior tibial pulses are intact and symetric. No carotid bruits noted.    ABDOMEN: Nontender. BS+. No bruits.  EXTREMITIES: No cyanosis, clubbing or edema.    Lab Results:  LIPIDS:  No results found for: CHOL  No results found for: HDL  No results found for: LDLCALC  No results found for: TRIG  No components found for: CHOLHDL    BMP:  Lab Results   Component Value Date    CREATININE 0.99 03/18/2018    BUN 8 03/18/2018     03/18/2018    K 3.5 03/18/2018     03/18/2018    CO2 21 (L) 03/18/2018         Jagdeep Dominguez MD  [facility]  [phone]

## 2021-07-03 NOTE — ADDENDUM NOTE
Addendum Note by Johnie Kruger RN at 6/30/2017  2:09 PM     Author: Johnie Kruger RN Service: -- Author Type: Registered Nurse    Filed: 6/30/2017  2:09 PM Encounter Date: 6/30/2017 Status: Signed    : Johnie Kruger RN (Registered Nurse)    Addended by: JOHNIE KRUGER on: 6/30/2017 02:09 PM        Modules accepted: Orders

## 2021-07-05 ENCOUNTER — OFFICE VISIT (OUTPATIENT)
Dept: NEUROLOGY | Facility: CLINIC | Age: 41
End: 2021-07-05
Payer: COMMERCIAL

## 2021-07-05 VITALS
BODY MASS INDEX: 29.83 KG/M2 | HEART RATE: 93 BPM | WEIGHT: 245 LBS | DIASTOLIC BLOOD PRESSURE: 87 MMHG | HEIGHT: 76 IN | SYSTOLIC BLOOD PRESSURE: 141 MMHG

## 2021-07-05 DIAGNOSIS — G43.009 MIGRAINE WITHOUT AURA AND WITHOUT STATUS MIGRAINOSUS, NOT INTRACTABLE: ICD-10-CM

## 2021-07-05 PROCEDURE — 99214 OFFICE O/P EST MOD 30 MIN: CPT | Performed by: PSYCHIATRY & NEUROLOGY

## 2021-07-05 RX ORDER — PHENTERMINE HYDROCHLORIDE 37.5 MG/1
1 TABLET ORAL DAILY
COMMUNITY
Start: 2021-06-24 | End: 2022-09-12

## 2021-07-05 RX ORDER — IBUPROFEN 600 MG/1
TABLET, FILM COATED ORAL
COMMUNITY
Start: 2021-05-30 | End: 2022-09-12

## 2021-07-05 RX ORDER — NORTRIPTYLINE HCL 10 MG
40 CAPSULE ORAL SEE ADMIN INSTRUCTIONS
Qty: 360 CAPSULE | Refills: 3 | Status: SHIPPED | OUTPATIENT
Start: 2021-07-05 | End: 2022-08-16

## 2021-07-05 RX ORDER — DIAZEPAM 2 MG
TABLET ORAL
COMMUNITY
Start: 2020-02-01 | End: 2022-09-12

## 2021-07-05 RX ORDER — CHOLECALCIFEROL (VITAMIN D3) 50 MCG
TABLET ORAL
COMMUNITY
Start: 2021-07-04

## 2021-07-05 RX ORDER — SUMATRIPTAN 100 MG/1
100 TABLET, FILM COATED ORAL PRN
Qty: 9 TABLET | Refills: 11 | Status: SHIPPED | OUTPATIENT
Start: 2021-07-05 | End: 2022-09-12

## 2021-07-05 ASSESSMENT — MIFFLIN-ST. JEOR: SCORE: 2112.81

## 2021-07-05 NOTE — PROGRESS NOTES
HPI  12/18/2018, in person visit  10/20/2020, video visit  11/10/2020, video visit  7 5/3/2021, in person visit      41-year-old followed neurologically for:  Common migraine headache  Onset of migraine age 11-12 years old  Headache frequency 2/month migraines.   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  Stays well-hydrated    Headaches are throbbing sometimes frontal sometimes posterior rarely bitemporal calls those tension-like  Can get some GI distress occasionally from his ibuprofen  Not much photophobia  No significant visual auras  Smells bother him with a headache  And sounds bother him when he has a headache        Headache profile back in summer 2020  Frequency, headache every day  Duration can last most of the day  Intensity, average from a mild at 1-3 out of 10 mostly, to occasionally been severe 10 out of 10  Missed 2 days of work maybe a week or 2 ago  Had a short prednisone burst that helped but when he was off of it headaches came back  Patient states that he was taken off metoprolol because the blood pressure was elevated just because of the stress  Primary is placed him back on a low-dose of atenolol to see if that would help with his headaches  He does have some asthma and uses inhalers but those are not exacerbated with the beta-blocker  Patient previously was using maybe 1-2 Imitrex per month now was using at least 4 Imitrex per month  Patient feels that when he is having a headache regularly he has difficulty with  thinking may be stuttering  Patient states that he has not had this bad a headache since he was a kid maybe 13 or so years old next  With bad headaches he does get photophobia phonophobia hypersensitivity to smell some nausea usually does not have visual changes and does not throw up as trouble concentrating            Neurologic follow-up for:    1. Common migraine.  2. Onset of migraines at the age of 10 to 11 years old.  3. History of getting headaches maybe every five to six times per year in the past.  4. Flurry of headaches for about two weeks, last time I saw him.        Family history is also positive for  Maternal grandfather had headaches  Son with Arnold-Chiari malformation  Paternal grandmother with Parkinson s  Mother with arthritis and depression  Father with arthritis  Daughter with migraine      Habits  Patient does smoke some, half a pack per day we counseled him to stop or at least cut down has trouble stopping  Does not drink alcohol.    Medical history:    1. Hypertension, on atenolol now  2. Common migraine.  3.          Anxiety  4.         Back surgery lumbar sciatica on the right side March 5, 2020  5.         Tympanoplasty  6.         Asthma      Workup in the past included:    1. MRI scan of the head on July 14, 2017, subtle T2 signal changes, otherwise unremarkable similar to 2013, slight increase  in the T2 signal changes.  2. CTA of the head and neck on November 2016, unremarkable.        Exam    Review of system  Headaches as above    No diplopia dysarthria dysphagia  No focal weakness numbness or tingling  No difficulty with gait or balance  No hearing loss  No visual changes    Does get occasional heartburn cautioned him on using too much ibuprofen    Otherwise review systems negative    General exam  HEENT normal  Lungs clear no wheezing  Heart rate regular  Abdomen soft  No edema the feet      Neurologic exam  Alert oriented x3  Normal naming  Normal comprehension  Normal prosody of  speech  Normal repetition  No aphasia  No neglect  Normal memory recall    Cranial nerves II through XII  No ophthalmoplegia or nystagmus  Visual fields are intact  Face is symmetrical  Tongue twisters are good    Upper extremities  No drift no tremor normal finger-nose    Lower extremities  Normal strength    Gait  Normal gait      Assessment/plan    Migraine headaches  Asthma      Current plan:    1.         Imitrex 100 mg tablet, one tablet p.o. q. day p.r.n. for severe headaches #9 with 11 refills.  2. Primary switched him to atenolol small dose for hypertension, but may help headache also.  (Seem to do better when he was on the metoprolol 50 mg/day)  3. Nortriptyline,  40 mg at nighttime using 10 mg tablets.  4. Previously tried Topamax, but it made him feel weird.  5. Will follow up in 1 year  6. Talked about good headache hygiene with proper hydration, sleep cycle, eating, and avoiding triggers.    Patient had some response to a short burst of 3 days of prednisone concerned that if this is used too often though he will lose effectiveness  Last prednisone dose February 2, 2021    We discussed  We talked about the difference between abortive and preventative medication. We talked about serotonin syndrome, as he is on some medication for his anxiety, nortriptyline, and Imitrex.      EMR notes reviewed  Total care time today 38 minutes

## 2021-07-05 NOTE — LETTER
7/5/2021         RE: Klaus Seaman  [address]  [place] [address]        Dear Colleague,    Thank you for referring your patient, Klaus Seaman, to the [facility] [place]. Please see a copy of my visit note below.      HPI  12/18/2018, in person visit  10/20/2020, video visit  11/10/2020, video visit  7 5/3/2021, in person visit      41-year-old followed neurologically for:  Common migraine headache  Onset of migraine age 11-12 years old  Headache frequency 2/month migraines.   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  Stays well-hydrated    Headaches are throbbing sometimes frontal sometimes posterior rarely bitemporal calls those tension-like  Can get some GI distress occasionally from his ibuprofen  Not much photophobia  No significant visual auras  Smells bother him with a headache  And sounds bother him when he has a headache        Headache profile back in summer 2020  Frequency, headache every day  Duration can last most of the day  Intensity, average from a mild at 1-3 out of 10 mostly, to occasionally been severe 10 out of 10  Missed 2 days of work maybe a week or 2 ago  Had a short prednisone burst that helped but when he was off of it headaches came back  Patient states that he was taken off metoprolol because the blood pressure was elevated just because of the stress  Primary is placed him back on a low-dose of atenolol to see if that would help with his headaches  He does have some asthma  and uses inhalers but those are not exacerbated with the beta-blocker  Patient previously was using maybe 1-2 Imitrex per month now was using at least 4 Imitrex per month  Patient feels that when he is having a headache regularly he has difficulty with thinking may be stuttering  Patient states that he has not had this bad a headache since he was a kid maybe 13 or so years old next  With bad headaches he does get photophobia phonophobia hypersensitivity to smell some nausea usually does not have visual changes and does not throw up as trouble concentrating            Neurologic follow-up for:    1. Common migraine.  2. Onset of migraines at the age of 10 to 11 years old.  3. History of getting headaches maybe every five to six times per year in the past.  4. Flurry of headaches for about two weeks, last time I saw him.        Family history is also positive for  Maternal grandfather had headaches  Son with Arnold-Chiari malformation  Paternal grandmother with Parkinson s  Mother with arthritis and depression  Father with arthritis  Daughter with migraine      Habits  Patient does smoke some, half a pack per day we counseled him to stop or at least cut down has trouble stopping  Does not drink alcohol.    Medical history:    1. Hypertension, on atenolol now  2. Common migraine.  3.          Anxiety  4.         Back surgery lumbar sciatica on the right side March 5, 2020  5.         Tympanoplasty  6.         Asthma      Workup in the past included:    1. MRI scan of the head on July 14, 2017, subtle T2 signal changes, otherwise unremarkable similar to 2013, slight increase  in the T2 signal changes.  2. CTA of the head and neck on November 2016, unremarkable.        Exam    Review of system  Headaches as above    No diplopia dysarthria dysphagia  No focal weakness numbness or tingling  No difficulty with gait or balance  No hearing loss  No visual changes    Does get occasional heartburn cautioned him on using too  much ibuprofen    Otherwise review systems negative    General exam  HEENT normal  Lungs clear no wheezing  Heart rate regular  Abdomen soft  No edema the feet      Neurologic exam  Alert oriented x3  Normal naming  Normal comprehension  Normal prosody of speech  Normal repetition  No aphasia  No neglect  Normal memory recall    Cranial nerves II through XII  No ophthalmoplegia or nystagmus  Visual fields are intact  Face is symmetrical  Tongue twisters are good    Upper extremities  No drift no tremor normal finger-nose    Lower extremities  Normal strength    Gait  Normal gait      Assessment/plan    Migraine headaches  Asthma      Current plan:    1.         Imitrex 100 mg tablet, one tablet p.o. q. day p.r.n. for severe headaches #9 with 11 refills.  2. Primary switched him to atenolol small dose for hypertension, but may help headache also.  (Seem to do better when he was on the metoprolol 50 mg/day)  3. Nortriptyline,  40 mg at nighttime using 10 mg tablets.  4. Previously tried Topamax, but it made him feel weird.  5. Will follow up in 1 year  6. Talked about good headache hygiene with proper hydration, sleep cycle, eating, and avoiding triggers.    Patient had some response to a short burst of 3 days of prednisone concerned that if this is used too often though he will lose effectiveness  Last prednisone dose February 2, 2021    We discussed  We talked about the difference between abortive and preventative medication. We talked about serotonin syndrome, as he is on some medication for his anxiety, nortriptyline, and Imitrex.      EMR notes reviewed  Total care time today 38 minutes        Again, thank you for allowing me to participate in the care of your patient.        Sincerely,        Yandel Ledesma MD

## 2021-07-05 NOTE — NURSING NOTE
Chief Complaint   Patient presents with     Migraine     Pt states he has been symptoms of migraine, but no pain.     Yamilet Bueno LPN on 7/5/2021 at 12:46 PM

## 2021-10-24 ENCOUNTER — HEALTH MAINTENANCE LETTER (OUTPATIENT)
Age: 41
End: 2021-10-24

## 2021-11-12 ENCOUNTER — IMMUNIZATION (OUTPATIENT)
Dept: NURSING | Facility: CLINIC | Age: 41
End: 2021-11-12
Payer: COMMERCIAL

## 2021-11-12 PROCEDURE — 0004A PR COVID VAC PFIZER DIL RECON 30 MCG/0.3 ML IM: CPT

## 2021-11-12 PROCEDURE — 91300 PR COVID VAC PFIZER DIL RECON 30 MCG/0.3 ML IM: CPT

## 2022-02-13 ENCOUNTER — HEALTH MAINTENANCE LETTER (OUTPATIENT)
Age: 42
End: 2022-02-13

## 2022-04-10 ENCOUNTER — MYC MEDICAL ADVICE (OUTPATIENT)
Dept: NEUROLOGY | Facility: CLINIC | Age: 42
End: 2022-04-10
Payer: COMMERCIAL

## 2022-04-10 DIAGNOSIS — G43.709 CHRONIC MIGRAINE WITHOUT AURA WITHOUT STATUS MIGRAINOSUS, NOT INTRACTABLE: Primary | ICD-10-CM

## 2022-04-10 NOTE — LETTER
April 12, 2022      Klaus Seaman  [address]  [place] [address]        To Whom It May Concern:    Klaus Seaman is followed neurologically for his migraine headaches.  His headache disorder at times flares and prevents him from being able to work.  Due to a flareup in his headaches he has missed work April 11, 2022 and April 12, 2022.    Okay to return to work April 13, 2022 no restrictions.      Sincerely,    Yandel Ledesma MD

## 2022-04-10 NOTE — LETTER
4/10/2022        RE: Klaus Seaman  [address]  [place] [address]        To Whom It May Concern:     Klaus Seaman is followed neurologically for his migraine headaches.  His headache disorder at times flares and prevents him from being able to work.  Due to a flareup in his headaches he has missed work April 11, 2022, April 12, 2022 and April 13, 2022.     Okay to return to work April 14, 2022 no restrictions      Sincerely,        Yandel Ledesma MD

## 2022-04-11 RX ORDER — PREDNISONE 20 MG/1
TABLET ORAL
Qty: 5 TABLET | Refills: 0 | Status: SHIPPED | OUTPATIENT
Start: 2022-04-11 | End: 2022-09-12

## 2022-04-11 NOTE — TELEPHONE ENCOUNTER
[facility] Call Center    Phone Message    May a detailed message be left on voicemail: yes     Reason for Call: Symptoms or Concerns     If patient has red-flag symptoms, warm transfer to triage line    Current symptom or concern: Migraine, Vertigo, Facial Numbness    Symptoms have been present for:  3-4 day(s)    Has patient previously been seen for this? Yes    By Dr. Ledesma    Are there any new or worsening symptoms? Yes: Pt reports migraine with vertigo and facial numbness. He has tried nortriptyline, SUMAtriptan, ibuprofen, and Tylenol and nothing has helped.    Please call Pt back at [phone] to advise.      Action Taken: Message routed to:  Other: [facility]    Travel Screening: Not Applicable

## 2022-04-11 NOTE — TELEPHONE ENCOUNTER
Let patient know that I sent in a prescription for prednisone 20 g tablet, 1 tab p.o. every morning for 5 days to break severe migraine headache.

## 2022-04-12 ENCOUNTER — TELEPHONE (OUTPATIENT)
Dept: NEUROLOGY | Facility: CLINIC | Age: 42
End: 2022-04-12
Payer: COMMERCIAL

## 2022-04-12 NOTE — TELEPHONE ENCOUNTER
Spoke to pt, he will  work note in the morning.  Note at  for .      Yamilet Bueno LPN on 4/12/2022 at 3:33 PM

## 2022-04-12 NOTE — TELEPHONE ENCOUNTER
[facility]    Phone Message    May a detailed message be left on voicemail: yes     Reason for Call: Other: Pt called to follow up on a [employer] message sent about getting a work note for 04/11-04/13 due to his continuous headache. Please call back with further information.      Action Taken: Message routed to:  [facility]    Travel Screening: Not Applicable

## 2022-04-13 NOTE — TELEPHONE ENCOUNTER
Pt stopped into clinic to  work note. Pt would like work excuse note to be changed to being excused for April 11-13, returning on the 14th.  New letter printed and signed by Dr. Ledesma.  Letter picked up by pt.    Yamilet Bueno LPN on 4/13/2022 at 12:33 PM

## 2022-05-24 ENCOUNTER — TELEPHONE (OUTPATIENT)
Dept: NEUROLOGY | Facility: CLINIC | Age: 42
End: 2022-05-24
Payer: COMMERCIAL

## 2022-05-24 NOTE — TELEPHONE ENCOUNTER
Dr. Ledesma-  Spoke to pt regarding message below.  Pt states over the past couple of weeks, on the weekends only, he has been getting migraines.  He uses the Imitrex and it has been working, just takes a lot longer to help than it used to.   He is still taking Nortriptyline 40mg at bedtime, and continues to take 25mg of atenolol.  Pt wondering if you have any other recommendations?    Yamilet Bueno LPN on 5/24/2022 at 10:39 AM

## 2022-05-24 NOTE — TELEPHONE ENCOUNTER
Make sure that there is not a big difference between caffeine intake during the week and caffeine intake on the weekends.  Also make sure that sleep patterns are somewhat similar on weekdays and weekends.  Make sure that you are staying well-hydrated    Seem that in the past when he was on metoprolol the headaches were under better control.  Question whether he should maybe see his primary and consider switching back to the metoprolol and take it at nighttime which will help both for his headache and his blood pressure control.    Keep follow-up visit (last seen July 2021 almost a year ago)    Yandel Ledesma MD on 5/24/2022 at 10:48 AM

## 2022-05-24 NOTE — TELEPHONE ENCOUNTER
Spoke to pt, relayed Dr. Ledesma recommendations to pt.  Pt verbalizes understanding, no further questions.    Yamilet Bueno LPN on 5/24/2022 at 11:24 AM

## 2022-05-24 NOTE — TELEPHONE ENCOUNTER
Health Call Center    Phone Message    May a detailed message be left on voicemail: yes     Reason for Call: Symptoms or Concerns     If patient has red-flag symptoms, warm transfer to triage line    Current symptom or concern: Migraines are worsening.    Symptoms have been present for:  Coupole week(s)    Has patient previously been seen for this? Yes    By Yandel Ledesma      Are there any new or worsening symptoms? Yes: Patient says his migraines are getting worse and he is having to miss work cause of this. Please call patient back to advise at #[phone]      Action Taken: Message routed to:  Other: [facility]    Travel Screening: Not Applicable

## 2022-08-16 ENCOUNTER — TELEPHONE (OUTPATIENT)
Dept: NEUROLOGY | Facility: CLINIC | Age: 42
End: 2022-08-16

## 2022-08-16 DIAGNOSIS — G43.009 MIGRAINE WITHOUT AURA AND WITHOUT STATUS MIGRAINOSUS, NOT INTRACTABLE: ICD-10-CM

## 2022-08-16 RX ORDER — NORTRIPTYLINE HCL 10 MG
40 CAPSULE ORAL SEE ADMIN INSTRUCTIONS
Qty: 360 CAPSULE | Refills: 0 | Status: SHIPPED | OUTPATIENT
Start: 2022-08-16 | End: 2022-09-12

## 2022-08-16 NOTE — TELEPHONE ENCOUNTER
Health Call Center    Phone Message    May a detailed message be left on voicemail: yes     Reason for Call: Medication Refill Request    Has the patient contacted the pharmacy for the refill? Yes   Name of medication being requested: nortriptyline (PAMELOR) 10 MG capsule  Provider who prescribed the medication: Dr. Ledesma  Pharmacy:[id number] - [place], [place] - [address] [place] [address] & [address]  Date medication is needed: ASAP    Action Taken: Message routed to:  [facility]    Travel Screening: Not Applicable

## 2022-08-16 NOTE — TELEPHONE ENCOUNTER
Refill request for nortriptyline.  Pt has upcoming appt on 9/12/22.  Medication T'd for review and signature      Yamilet Bueno LPN on 8/16/2022 at 4:13 PM

## 2022-09-10 NOTE — PROGRESS NOTES
HPI  12/18/2018, in person visit  10/20/2020, video visit  11/10/2020, video visit  7/ 5/2021, in person visit  9/12/2022      42-year-old followed neurologically for:  Common migraine headache      Patient on atenolol at night for blood pressure/headache through primary MD doing well  Nortriptyline 40 mg at night for migraines    Patient had a flurry of migraines in June due to increased stress now doing better    Imitrex usually helps the headache      A.  Migraine headaches       Common migraine       Onset age 11-12 years old       Sometimes occurring flurries       Daughter with migraines      Patient had some response to a short burst of 3 days of prednisone concerned that if this is used too often though he will lose effectiveness  Last prednisone dose February 2, 2021    Prednisone burst : (Prednisone 20 mg tablet 1 every morning, 3-5 days)  2/2/2021  4/10/2022                            July/2021             9/12/2022  Frequency      2-3/week           once/week                          Mild                     mild                            2/month            once/QOWK                         Moderate              mild    Duration         2 hours                 2 hours      Associated symptoms  Photophobia mild  Nausea  No visual changes  Smells bother him during headache    Abortive therapies  Motrin  Imitrex  Prednisone burst    Preventive therapies  Nortriptyline  Atenolol  Metoprolol  Topamax        Headache profile  Frequency of headaches  Migraine headaches 2/month  Duration of migraines less than 2 hours if he takes the Imitrex  Imitrex 100 mg once per day rates the headache well to get it in early  Mild bitemporal headaches 2-3/week, treated with 4 tabs of Motrin (800 mg total)  Knows good headache hygiene, proper sleep proper nutrition proper hydration and avoiding triggers    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  Stays well-hydrated    Headaches are throbbing sometimes frontal  sometimes posterior rarely bitemporal calls those tension-like  Can get some GI distress occasionally from his ibuprofen  Not much photophobia  No significant visual auras  Smells bother him with a headache  And sounds bother him when he has a headache        Headache profile back in summer 2020  Frequency, headache every day  Duration can last most of the day  Intensity, average from a mild at 1-3 out of 10 mostly, to occasionally been severe 10 out of 10  Missed 2 days of work maybe a week or 2 ago  Had a short prednisone burst that helped but when he was off of it headaches came back  Patient states that he was taken off metoprolol because the blood pressure was elevated just because of the stress  Primary is placed him back on a low-dose of atenolol to see if that would help with his headaches  He does have some asthma and uses inhalers but those are not exacerbated with the beta-blocker  Patient previously was using maybe 1-2 Imitrex per month now was using at least 4 Imitrex per month  Patient feels that when he is having a headache regularly he has difficulty with thinking may be stuttering  Patient states that he has not had this bad a headache since he was a kid maybe 13 or so years old next  With bad headaches he does get photophobia phonophobia hypersensitivity to smell some nausea usually does not have visual changes and does not throw up as trouble concentrating      B.  Hypertension      C.  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      Family history is also positive for  Maternal grandfather had headaches  Son with  "Arnold-Chiari malformation  Paternal grandmother with Parkinson s  Mother with arthritis and depression  Father with arthritis  Daughter with migraine      Workup in the past included:  1. MRI scan of the head on July 14, 2017, subtle T2 signal changes, otherwise unremarkable similar to 2013, slight increase  in the T2 signal changes.  2. CTA of the head and neck on November 2016, unremarkable.            Exam    Review of system  Headaches as above    No diplopia dysarthria dysphagia  No focal weakness numbness or tingling  No difficulty with gait or balance  No hearing loss  No visual changes    Does get occasional heartburn cautioned him on using too much ibuprofen    Otherwise review systems negative    General exam  Blood pressure 113/73, pulse 76  HEENT normal  Lungs clear no wheezing  Heart rate regular  Abdomen soft  No edema the feet      Neurologic exam  Alert oriented x3  Normal naming  Normal comprehension  Normal prosody of speech  Normal repetition  No aphasia  No neglect  Normal memory recall    Cranial nerves II through XII  No ophthalmoplegia or nystagmus  Visual fields are intact  Face is symmetrical  Tongue twisters are good    Upper extremities  No drift no tremor normal finger-nose    Lower extremities  Normal strength    Gait  Normal gait      Assessment/plan    Migraine headaches  Asthma      Assessment/plan    1.    Common migraine          Imitrex 100 mg tablet, one tablet p.o. q. day p.r.n. for severe headaches #9 with 11 refills.                   Nortriptyline 40 mg p.o. nightly (on 10 mg tablets)          Previously tried Topamax but made him feel \"weird\"          On beta-blocker per primary MD for hypertension (atenolol 25 mg once at nighttime)    2.   Hypertension        Treated by primary MD        Primary switched him to atenolol small dose for hypertension, but may help headache also.  (Seem to do better when he was on the metoprolol 50 mg/day)        Patient had some response to a " short burst of 3 days of prednisone concerned that if this is used too often though he will lose effectiveness  Prednisone burst  2/2/2021  4/10/2022        We discussed  We talked about the difference between abortive and preventative medication. We talked about serotonin syndrome, as he is on some medication for his anxiety, nortriptyline, and Imitrex.  We talked about good headache hygiene      Total care time today 32 minutes    Follow-up in 1 year

## 2022-09-12 ENCOUNTER — OFFICE VISIT (OUTPATIENT)
Dept: NEUROLOGY | Facility: CLINIC | Age: 42
End: 2022-09-12
Payer: COMMERCIAL

## 2022-09-12 VITALS
DIASTOLIC BLOOD PRESSURE: 73 MMHG | SYSTOLIC BLOOD PRESSURE: 113 MMHG | BODY MASS INDEX: 29.52 KG/M2 | HEIGHT: 76 IN | HEART RATE: 76 BPM | WEIGHT: 242.4 LBS

## 2022-09-12 DIAGNOSIS — G43.009 MIGRAINE WITHOUT AURA AND WITHOUT STATUS MIGRAINOSUS, NOT INTRACTABLE: ICD-10-CM

## 2022-09-12 PROCEDURE — 99214 OFFICE O/P EST MOD 30 MIN: CPT | Performed by: PSYCHIATRY & NEUROLOGY

## 2022-09-12 RX ORDER — SUMATRIPTAN 100 MG/1
100 TABLET, FILM COATED ORAL PRN
Qty: 9 TABLET | Refills: 11 | Status: SHIPPED | OUTPATIENT
Start: 2022-09-12

## 2022-09-12 RX ORDER — DULOXETIN HYDROCHLORIDE 60 MG/1
CAPSULE, DELAYED RELEASE ORAL
COMMUNITY
Start: 2022-07-23

## 2022-09-12 RX ORDER — NORTRIPTYLINE HCL 10 MG
40 CAPSULE ORAL SEE ADMIN INSTRUCTIONS
Qty: 360 CAPSULE | Refills: 3 | Status: SHIPPED | OUTPATIENT
Start: 2022-09-12

## 2022-09-12 NOTE — LETTER
9/12/2022         RE: Klaus Seaman  [address]  [place] [address]        Dear Colleague,    Thank you for referring your patient, Klaus Seaman, to the [facility] [place]. Please see a copy of my visit note below.      HPI  12/18/2018, in person visit  10/20/2020, video visit  11/10/2020, video visit  7/ 5/2021, in person visit  9/12/2022      42-year-old followed neurologically for:  Common migraine headache      Patient on atenolol at night for blood pressure/headache through primary MD doing well  Nortriptyline 40 mg at night for migraines    Patient had a flurry of migraines in June due to increased stress now doing better    Imitrex usually helps the headache      A.  Migraine headaches       Common migraine       Onset age 11-12 years old       Sometimes occurring flurries       Daughter with migraines      Patient had some response to a short burst of 3 days of prednisone concerned that if this is used too often though he will lose effectiveness  Last prednisone dose February 2, 2021    Prednisone burst : (Prednisone 20 mg tablet 1 every morning, 3-5 days)  2/2/2021  4/10/2022                            July/2021             9/12/2022  Frequency      2-3/week           once/week                          Mild                     mild                            2/month            once/QOWK                         Moderate              mild    Duration         2 hours                 2 hours      Associated symptoms  Photophobia mild  Nausea  No visual changes  Smells bother him during headache    Abortive therapies  Motrin  Imitrex  Prednisone burst    Preventive therapies  Nortriptyline  Atenolol  Metoprolol  Topamax        Headache profile  Frequency of headaches  Migraine headaches 2/month  Duration of migraines less than 2 hours if he takes the Imitrex  Imitrex 100 mg once per day rates the headache well to get it in early  Mild bitemporal headaches 2-3/week, treated with  4 tabs of Motrin (800 mg total)  Knows good headache hygiene, proper sleep proper nutrition proper hydration and avoiding triggers    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  Stays well-hydrated    Headaches are throbbing sometimes frontal sometimes posterior rarely bitemporal calls those tension-like  Can get some GI distress occasionally from his ibuprofen  Not much photophobia  No significant visual auras  Smells bother him with a headache  And sounds bother him when he has a headache        Headache profile back in summer 2020  Frequency, headache every day  Duration can last most of the day  Intensity, average from a mild at 1-3 out of 10 mostly, to occasionally been severe 10 out of 10  Missed 2 days of work maybe a week or 2 ago  Had a short prednisone burst that helped but when he was off of it headaches came back  Patient states that he was taken off metoprolol because the blood pressure was elevated just because of the stress  Primary is placed him back on a low-dose of atenolol to see if that would help with his headaches  He does have some asthma and uses inhalers but those are not exacerbated with the beta-blocker  Patient previously was using maybe 1-2 Imitrex per month now was using at least 4 Imitrex per month  Patient feels that when he is having a headache regularly he has difficulty with thinking may be stuttering  Patient states that he has not had this bad a headache since he was a kid maybe 13 or so years old next  With bad headaches he does get photophobia phonophobia hypersensitivity to smell some nausea usually does not have visual changes and does not throw up as trouble concentrating      B.  Hypertension      C.  Ear surgery on the right at a cyst in the canal had some ear bones removed ENT following hearing        Ear surgery December 2021    Past medical history  Hypertension   Common migraine  Anxiety  Asthma  Tympanoplasty  Back surgery lumbar sciatica on the right side  "March 5, 2020    Habits  Patient does smoke some, half a pack per day we counseled him to stop or at least cut down has trouble stopping  Does not drink alcohol.      Family history is also positive for  Maternal grandfather had headaches  Son with Arnold-Chiari malformation  Paternal grandmother with Parkinson s  Mother with arthritis and depression  Father with arthritis  Daughter with migraine      Workup in the past included:  1. MRI scan of the head on July 14, 2017, subtle T2 signal changes, otherwise unremarkable similar to 2013, slight increase  in the T2 signal changes.  2. CTA of the head and neck on November 2016, unremarkable.            Exam    Review of system  Headaches as above    No diplopia dysarthria dysphagia  No focal weakness numbness or tingling  No difficulty with gait or balance  No hearing loss  No visual changes    Does get occasional heartburn cautioned him on using too much ibuprofen    Otherwise review systems negative    General exam  Blood pressure 113/73, pulse 76  HEENT normal  Lungs clear no wheezing  Heart rate regular  Abdomen soft  No edema the feet      Neurologic exam  Alert oriented x3  Normal naming  Normal comprehension  Normal prosody of speech  Normal repetition  No aphasia  No neglect  Normal memory recall    Cranial nerves II through XII  No ophthalmoplegia or nystagmus  Visual fields are intact  Face is symmetrical  Tongue twisters are good    Upper extremities  No drift no tremor normal finger-nose    Lower extremities  Normal strength    Gait  Normal gait      Assessment/plan    Migraine headaches  Asthma      Assessment/plan    1.    Common migraine          Imitrex 100 mg tablet, one tablet p.o. q. day p.r.n. for severe headaches #9 with 11 refills.                   Nortriptyline 40 mg p.o. nightly (on 10 mg tablets)          Previously tried Topamax but made him feel \"weird\"          On beta-blocker per primary MD for hypertension (atenolol 25 mg once at " nighttime)    2.   Hypertension        Treated by primary MD        Primary switched him to atenolol small dose for hypertension, but may help headache also.  (Seem to do better when he was on the metoprolol 50 mg/day)        Patient had some response to a short burst of 3 days of prednisone concerned that if this is used too often though he will lose effectiveness  Prednisone burst  2/2/2021  4/10/2022        We discussed  We talked about the difference between abortive and preventative medication. We talked about serotonin syndrome, as he is on some medication for his anxiety, nortriptyline, and Imitrex.  We talked about good headache hygiene      Total care time today 32 minutes    Follow-up in 1 year        Again, thank you for allowing me to participate in the care of your patient.        Sincerely,        Yandel Ledesma MD

## 2022-09-12 NOTE — NURSING NOTE
Chief Complaint   Patient presents with     Migraine     Follow up      Phoenix Loo CMA@ on 9/12/2022 at 3:10 PM

## 2022-10-15 ENCOUNTER — HEALTH MAINTENANCE LETTER (OUTPATIENT)
Age: 42
End: 2022-10-15

## 2023-03-26 ENCOUNTER — HEALTH MAINTENANCE LETTER (OUTPATIENT)
Age: 43
End: 2023-03-26

## 2024-05-26 ENCOUNTER — HEALTH MAINTENANCE LETTER (OUTPATIENT)
Age: 44
End: 2024-05-26

## 2024-06-17 ENCOUNTER — APPOINTMENT (OUTPATIENT)
Dept: CT IMAGING | Facility: HOSPITAL | Age: 44
End: 2024-06-17
Attending: STUDENT IN AN ORGANIZED HEALTH CARE EDUCATION/TRAINING PROGRAM
Payer: MEDICAID

## 2024-06-17 ENCOUNTER — HOSPITAL ENCOUNTER (EMERGENCY)
Facility: HOSPITAL | Age: 44
Discharge: HOME | End: 2024-06-18
Attending: STUDENT IN AN ORGANIZED HEALTH CARE EDUCATION/TRAINING PROGRAM | Admitting: STUDENT IN AN ORGANIZED HEALTH CARE EDUCATION/TRAINING PROGRAM
Payer: MEDICAID

## 2024-06-17 DIAGNOSIS — R06.02 SHORTNESS OF BREATH: ICD-10-CM

## 2024-06-17 LAB
ACANTHOCYTES BLD QL SMEAR: NORMAL
ANION GAP SERPL CALCULATED.3IONS-SCNC: 8 MMOL/L (ref 7–15)
AUER BODIES BLD QL SMEAR: NORMAL
BASO STIPL BLD QL SMEAR: NORMAL
BASOPHILS # BLD AUTO: 0.1 10E3/UL (ref 0–0.2)
BASOPHILS NFR BLD AUTO: 1 %
BITE CELLS BLD QL SMEAR: NORMAL
BLISTER CELLS BLD QL SMEAR: NORMAL
BUN SERPL-MCNC: 12.3 MG/DL (ref 6–20)
BURR CELLS BLD QL SMEAR: NORMAL
CALCIUM SERPL-MCNC: 9 MG/DL (ref 8.6–10)
CHLORIDE SERPL-SCNC: 106 MMOL/L (ref 98–107)
CREAT SERPL-MCNC: 1.13 MG/DL (ref 0.67–1.17)
DACRYOCYTES BLD QL SMEAR: NORMAL
DEPRECATED HCO3 PLAS-SCNC: 24 MMOL/L (ref 22–29)
EGFRCR SERPLBLD CKD-EPI 2021: 82 ML/MIN/1.73M2
ELLIPTOCYTES BLD QL SMEAR: NORMAL
EOSINOPHIL # BLD AUTO: 0.2 10E3/UL (ref 0–0.7)
EOSINOPHIL NFR BLD AUTO: 3 %
ERYTHROCYTE [DISTWIDTH] IN BLOOD BY AUTOMATED COUNT: 12.3 % (ref 10–15)
FLUAV RNA SPEC QL NAA+PROBE: NEGATIVE
FLUBV RNA RESP QL NAA+PROBE: NEGATIVE
FRAGMENTS BLD QL SMEAR: NORMAL
GIANT PLATELETS BLD QL SMEAR: NORMAL
GLUCOSE SERPL-MCNC: 96 MG/DL (ref 70–99)
HCT VFR BLD AUTO: 43.7 % (ref 40–53)
HGB BLD-MCNC: 15.2 G/DL (ref 13.3–17.7)
HGB C CRYSTALS: NORMAL
HOWELL-JOLLY BOD BLD QL SMEAR: NORMAL
IMM GRANULOCYTES # BLD: 0 10E3/UL
IMM GRANULOCYTES NFR BLD: 0 %
LYMPHOCYTES # BLD AUTO: 1.8 10E3/UL (ref 0.8–5.3)
LYMPHOCYTES NFR BLD AUTO: 24 %
MCH RBC QN AUTO: 31 PG (ref 26.5–33)
MCHC RBC AUTO-ENTMCNC: 34.8 G/DL (ref 31.5–36.5)
MCV RBC AUTO: 89 FL (ref 78–100)
MONOCYTES # BLD AUTO: 0.6 10E3/UL (ref 0–1.3)
MONOCYTES NFR BLD AUTO: 8 %
NEUTROPHILS # BLD AUTO: 4.8 10E3/UL (ref 1.6–8.3)
NEUTROPHILS NFR BLD AUTO: 65 %
NEUTS HYPERSEG BLD QL SMEAR: NORMAL
NRBC # BLD AUTO: 0 10E3/UL
NRBC BLD AUTO-RTO: 0 /100
PATH REV: NORMAL
PLAT MORPH BLD: NORMAL
PLATELET # BLD AUTO: 156 10E3/UL (ref 150–450)
POLYCHROMASIA BLD QL SMEAR: NORMAL
POTASSIUM SERPL-SCNC: 4 MMOL/L (ref 3.4–5.3)
RBC # BLD AUTO: 4.91 10E6/UL (ref 4.4–5.9)
RBC AGGLUT BLD QL: NORMAL
RBC MORPH BLD: NORMAL
ROULEAUX BLD QL SMEAR: NORMAL
RSV RNA SPEC NAA+PROBE: NEGATIVE
SARS-COV-2 RNA RESP QL NAA+PROBE: NEGATIVE
SICKLE CELLS BLD QL SMEAR: NORMAL
SMUDGE CELLS BLD QL SMEAR: NORMAL
SODIUM SERPL-SCNC: 138 MMOL/L (ref 135–145)
SPHEROCYTES BLD QL SMEAR: NORMAL
STOMATOCYTES BLD QL SMEAR: NORMAL
TARGETS BLD QL SMEAR: NORMAL
TOXIC GRANULES BLD QL SMEAR: NORMAL
TROPONIN T SERPL HS-MCNC: 7 NG/L
VARIANT LYMPHS BLD QL SMEAR: NORMAL
WBC # BLD AUTO: 7.5 10E3/UL (ref 4–11)

## 2024-06-17 PROCEDURE — 87637 SARSCOV2&INF A&B&RSV AMP PRB: CPT | Performed by: STUDENT IN AN ORGANIZED HEALTH CARE EDUCATION/TRAINING PROGRAM

## 2024-06-17 PROCEDURE — 80048 BASIC METABOLIC PNL TOTAL CA: CPT | Performed by: STUDENT IN AN ORGANIZED HEALTH CARE EDUCATION/TRAINING PROGRAM

## 2024-06-17 PROCEDURE — 85025 COMPLETE CBC W/AUTO DIFF WBC: CPT | Performed by: STUDENT IN AN ORGANIZED HEALTH CARE EDUCATION/TRAINING PROGRAM

## 2024-06-17 PROCEDURE — 250N000011 HC RX IP 250 OP 636: Performed by: STUDENT IN AN ORGANIZED HEALTH CARE EDUCATION/TRAINING PROGRAM

## 2024-06-17 PROCEDURE — 93005 ELECTROCARDIOGRAM TRACING: CPT | Performed by: STUDENT IN AN ORGANIZED HEALTH CARE EDUCATION/TRAINING PROGRAM

## 2024-06-17 PROCEDURE — 84484 ASSAY OF TROPONIN QUANT: CPT | Performed by: STUDENT IN AN ORGANIZED HEALTH CARE EDUCATION/TRAINING PROGRAM

## 2024-06-17 PROCEDURE — 71275 CT ANGIOGRAPHY CHEST: CPT

## 2024-06-17 PROCEDURE — 99285 EMERGENCY DEPT VISIT HI MDM: CPT | Mod: 25

## 2024-06-17 PROCEDURE — 36415 COLL VENOUS BLD VENIPUNCTURE: CPT | Performed by: STUDENT IN AN ORGANIZED HEALTH CARE EDUCATION/TRAINING PROGRAM

## 2024-06-17 RX ORDER — IOPAMIDOL 755 MG/ML
90 INJECTION, SOLUTION INTRAVASCULAR ONCE
Status: COMPLETED | OUTPATIENT
Start: 2024-06-18 | End: 2024-06-17

## 2024-06-17 RX ADMIN — IOPAMIDOL 90 ML: 755 INJECTION, SOLUTION INTRAVENOUS at 23:52

## 2024-06-17 ASSESSMENT — ACTIVITIES OF DAILY LIVING (ADL)
ADLS_ACUITY_SCORE: 33
ADLS_ACUITY_SCORE: 35

## 2024-06-17 NOTE — Clinical Note
Klaus Seaman was seen and treated in our emergency department on 6/17/2024.  He may return to work on 06/20/2024.       If you have any questions or concerns, please don't hesitate to call.      Chandrakant Manzo MD

## 2024-06-18 VITALS
WEIGHT: 242 LBS | HEIGHT: 76 IN | BODY MASS INDEX: 29.47 KG/M2 | TEMPERATURE: 98.2 F | HEART RATE: 82 BPM | RESPIRATION RATE: 23 BRPM | OXYGEN SATURATION: 97 % | DIASTOLIC BLOOD PRESSURE: 89 MMHG | SYSTOLIC BLOOD PRESSURE: 139 MMHG

## 2024-06-18 LAB
ATRIAL RATE - MUSE: 84 BPM
DIASTOLIC BLOOD PRESSURE - MUSE: NORMAL MMHG
HOLD SPECIMEN: NORMAL
INTERPRETATION ECG - MUSE: NORMAL
P AXIS - MUSE: 64 DEGREES
PR INTERVAL - MUSE: 158 MS
QRS DURATION - MUSE: 118 MS
QT - MUSE: 362 MS
QTC - MUSE: 427 MS
R AXIS - MUSE: 55 DEGREES
SYSTOLIC BLOOD PRESSURE - MUSE: NORMAL MMHG
T AXIS - MUSE: 54 DEGREES
VENTRICULAR RATE- MUSE: 84 BPM

## 2024-06-18 NOTE — DISCHARGE INSTRUCTIONS
Your workup in the emergency department was reassuring, but if your symptoms persist, you may need further workup with your primary care provider.  Please call them in the morning to discuss.    Return to the emergency department if your symptoms worsen.

## 2024-06-18 NOTE — ED TRIAGE NOTES
Over the past 4 days pt has experienced shortness of breath and dizziness (like he is going to pass out) Intermittent but worsening over the past 4 days.

## 2024-06-18 NOTE — ED PROVIDER NOTES
EMERGENCY DEPARTMENT ENCOUNTER      NAME: Klaus Seaman  AGE: 44 year old male  YOB: 1980  MRN: [id number]  EVALUATION DATE & TIME: 6/17/2024  9:56 PM    PCP: Vivian Gonzales    ED PROVIDER: Chandrakant Manzo MD      Chief Complaint   Patient presents with    Shortness of Breath    Dizziness         FINAL IMPRESSION:  1. Shortness of breath          ED COURSE & MEDICAL DECISION MAKING:    Pertinent Labs & Imaging studies reviewed. (See chart for details)  44 year old male presents to the Emergency Department for evaluation of SOB    ED Course as of 06/18/24 0023   Mon Jun 17, 2024 2212 Patient is a 44-year-old male with a past medical history significant for smoking (quit in October), hypertension, significant family history of cardiac disease, and works as a long-haul .  4 days ago he has been experiencing constant shortness of breath, intermittent lightheadedness with exertion and bending over, and left-sided chest pain.  He is in no acute distress on exam, but does have mild tachycardia on arrival.  Exam is reassuring, without wheezes.  Differential diagnosis includes pulmonary embolism, ACS, anemia, pneumonia.   2235 EKG is sinus rhythm at a rate of 84, no ST segment changes indicative of emergent ischemia.   Tue Jun 18, 2024   0004 Troponin normal, and given duration of symptoms does not warrant repeating.  Viral swabs negative.  No electrolyte abnormalities or kidney injury.  No leukocytosis or anemia.   0020 No acute pathology on CT PE.  Will discharge at this time with return precautions and instructions to follow-up with his PCP.       Medical Decision Making  Obtained supplemental history:Supplemental history obtained?: No  Reviewed external records: External records reviewed?: No  Care impacted by chronic illness:Hyperlipidemia and Hypertension  Care significantly affected by social determinants of health:N/A  Did you consider but not order tests?: Work up considered but not performed  and documented in chart, if applicable  Did you interpret images independently?: Independent interpretation of ECG and images noted in documentation, when applicable.  Consultation discussion with other provider:Did you involve another provider (consultant, , pharmacy, etc.)?: No  Discharge. No recommendations on prescription strength medication(s). See documentation for any additional details.        At the conclusion of the encounter I discussed the results of all of the tests and the disposition. The questions were answered. The patient or family acknowledged understanding and was agreeable with the care plan.     0 minutes of critical care time     MEDICATIONS GIVEN IN THE EMERGENCY:  Medications   iopamidol (ISOVUE-370) solution 90 mL (90 mLs Intravenous $Given 6/17/24 5563)       NEW PRESCRIPTIONS STARTED AT TODAY'S ER VISIT  New Prescriptions    No medications on file          =================================================================    HPI    Patient information was obtained from: The patient     Use of : N/A         Klaus Seaman is a 44 year old male with a pertinent history of HTN and HLD who presents to this ED by private vehicle for evaluation of shortness of breath and dizziness.     The patient has had 4 days of shortness of breath and dizziness. He notes that he had a sudden onset of symptoms when sitting watching television. He has since had constant shortness of breath and intermittent dizziness. He has had room spinning dizziness when he squats down or turns his head quickly. He is currently not dizzy. He also endorses intermittent sharp armpit pain. He has had a slight dry cough. He denies any other chest pain, wheezing, leg swelling, or fever. He denies any recent surgery, hospitalization, cancer treatment, or travel. He has no history of blood clots. 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       PAST MEDICAL HISTORY:  Past Medical History:    Diagnosis Date    CTS (carpal tunnel syndrome)     Hyperlipidemia     Hypertension     Migraines        PAST SURGICAL HISTORY:  Past Surgical History:   Procedure Laterality Date    BACK SURGERY      ENT SURGERY      NASAL SEPTUM SURGERY  03/21/2017    SOFT TISSUE SURGERY      WRIST SURGERY             CURRENT MEDICATIONS:    albuterol (PROAIR HFA/PROVENTIL HFA/VENTOLIN HFA) 108 (90 Base) MCG/ACT inhaler  atenolol (TENORMIN) 25 MG tablet  DULoxetine (CYMBALTA) 60 MG capsule  loratadine (CLARITIN) 10 MG tablet  nortriptyline (PAMELOR) 10 MG capsule  omega-3 acid ethyl esters (LOVAZA) 1 g capsule  SUMAtriptan (IMITREX) 100 MG tablet  vitamin B-12 (CYANOCOBALAMIN) 2000 MCG tablet  vitamin D3 (CHOLECALCIFEROL) 50 mcg (2000 units) tablet        ALLERGIES:  Allergies   Allergen Reactions    Sulfasalazine Hives    Codeine Other (See Comments)     Childhood  Unable to sleep  Unknown reaction during child campos      Hydrocodone Other (See Comments)     Mood change - angry    Hydrocodone-Acetaminophen      Other reaction(s): *Unknown  Mood swings    Morphine Nausea and Vomiting    Sulfa Antibiotics Hives       FAMILY HISTORY:  Family History   Problem Relation Age of Onset    Diabetes Mother     Lupus Mother     Back Pain Mother     Hypertension Father     Hyperlipidemia Father     Heart Surgery Father     Cancer Father     Coronary Artery Disease Father     Coronary Stenting Father        SOCIAL HISTORY:   Social History     Socioeconomic History    Marital status:    Tobacco Use    Smoking status: Every Day     Current packs/day: 0.50     Average packs/day: 0.5 packs/day for 22.0 years (11.0 ttl pk-yrs)     Types: Cigarettes    Smokeless tobacco: Never   Substance and Sexual Activity    Alcohol use: Yes     Comment: very rare     Drug use: Never    Sexual activity: Not Currently   Other Topics Concern    Parent/sibling w/ CABG, MI or angioplasty before 65F 55M? No       VITALS:  /89   Pulse 83   Temp 98.2  " F (36.8  C)   Resp 22   Ht 1.93 m (6' 4\")   Wt 109.8 kg (242 lb)   SpO2 97%   BMI 29.46 kg/m      PHYSICAL EXAM    Physical Exam  Vitals and nursing note reviewed.   Constitutional:       General: He is not in acute distress.     Appearance: Normal appearance. He is normal weight. He is not ill-appearing.   HENT:      Head: Normocephalic and atraumatic.      Nose: Nose normal.      Mouth/Throat:      Mouth: Mucous membranes are moist.      Pharynx: Oropharynx is clear.   Eyes:      Extraocular Movements: Extraocular movements intact.      Conjunctiva/sclera: Conjunctivae normal.      Pupils: Pupils are equal, round, and reactive to light.   Cardiovascular:      Rate and Rhythm: Regular rhythm. Tachycardia present.      Pulses: Normal pulses.      Heart sounds: Normal heart sounds. No murmur heard.  Pulmonary:      Effort: Pulmonary effort is normal. No respiratory distress.      Breath sounds: Normal breath sounds.   Abdominal:      General: Abdomen is flat. There is no distension.      Palpations: Abdomen is soft.      Tenderness: There is no abdominal tenderness.   Musculoskeletal:         General: Normal range of motion.      Cervical back: Normal range of motion.      Right lower leg: No edema.      Left lower leg: No edema.   Skin:     General: Skin is warm and dry.      Capillary Refill: Capillary refill takes less than 2 seconds.      Coloration: Skin is not pale.   Neurological:      General: No focal deficit present.      Mental Status: He is alert and oriented to person, place, and time. Mental status is at baseline.   Psychiatric:         Mood and Affect: Mood normal.         Behavior: Behavior normal.         Thought Content: Thought content normal.         Judgment: Judgment normal.            LAB:  All pertinent labs reviewed and interpreted.  Results for orders placed or performed during the hospital encounter of 06/17/24   CT Chest Pulmonary Embolism w Contrast    Impression    IMPRESSION:  1.  " There is no pulmonary embolus, aortic aneurysm or dissection. No acute abnormality.   Basic metabolic panel   Result Value Ref Range    Sodium 138 135 - 145 mmol/L    Potassium 4.0 3.4 - 5.3 mmol/L    Chloride 106 98 - 107 mmol/L    Carbon Dioxide (CO2) 24 22 - 29 mmol/L    Anion Gap 8 7 - 15 mmol/L    Urea Nitrogen 12.3 6.0 - 20.0 mg/dL    Creatinine 1.13 0.67 - 1.17 mg/dL    GFR Estimate 82 >60 mL/min/1.73m2    Calcium 9.0 8.6 - 10.0 mg/dL    Glucose 96 70 - 99 mg/dL   Result Value Ref Range    Troponin T, High Sensitivity 7 <=22 ng/L   Symptomatic Influenza A/B, RSV, & SARS-CoV2 PCR (COVID-19) Nasopharyngeal    Specimen: Nasopharyngeal; Swab   Result Value Ref Range    Influenza A PCR Negative Negative    Influenza B PCR Negative Negative    RSV PCR Negative Negative    SARS CoV2 PCR Negative Negative   CBC with platelets and differential   Result Value Ref Range    WBC Count 7.5 4.0 - 11.0 10e3/uL    RBC Count 4.91 4.40 - 5.90 10e6/uL    Hemoglobin 15.2 13.3 - 17.7 g/dL    Hematocrit 43.7 40.0 - 53.0 %    MCV 89 78 - 100 fL    MCH 31.0 26.5 - 33.0 pg    MCHC 34.8 31.5 - 36.5 g/dL    RDW 12.3 10.0 - 15.0 %    Platelet Count 156 150 - 450 10e3/uL    % Neutrophils 65 %    % Lymphocytes 24 %    % Monocytes 8 %    % Eosinophils 3 %    % Basophils 1 %    % Immature Granulocytes 0 %    NRBCs per 100 WBC 0 <1 /100    Absolute Neutrophils 4.8 1.6 - 8.3 10e3/uL    Absolute Lymphocytes 1.8 0.8 - 5.3 10e3/uL    Absolute Monocytes 0.6 0.0 - 1.3 10e3/uL    Absolute Eosinophils 0.2 0.0 - 0.7 10e3/uL    Absolute Basophils 0.1 0.0 - 0.2 10e3/uL    Absolute Immature Granulocytes 0.0 <=0.4 10e3/uL    Absolute NRBCs 0.0 10e3/uL   RBC and Platelet Morphology   Result Value Ref Range    RBC Morphology Confirmed RBC Indices     Platelet Assessment  Automated Count Confirmed. Platelet morphology is normal.     Automated Count Confirmed. Platelet morphology is normal.    Giant Platelets      Acanthocytes      Hang Rods      Basophilic  Stippling      Bite Cells      Blister Cells      [place] Cells      Elliptocytes      Hgb C Crystals      Jeronimo-Jolly Bodies      Hypersegmented Neutrophils      Polychromasia      RBC agglutination      RBC Fragments      Reactive Lymphocytes      Rouleaux      Sickle Cells      Smudge Cells      Spherocytes      Stomatocytes      Target Cells      Teardrop Cells      Toxic Neutrophils      Pathologist Review Comments (Blood)     Extra Purple Top Tube   Result Value Ref Range    Hold Specimen JIC        RADIOLOGY:  Reviewed all pertinent imaging. Please see official radiology report.  CT Chest Pulmonary Embolism w Contrast   Final Result   IMPRESSION:   1.  There is no pulmonary embolus, aortic aneurysm or dissection. No acute abnormality.          PROCEDURES:   None      [facility] Documentation:   CMS Diagnoses:               I, Katie Naqvi, am serving as a scribe to document services personally performed by Chandrakant Manzo MD based on my observation and the provider's statements to me. I, Chandrakant Manzo MD, attest that Katie Naqvi is acting in a scribe capacity, has observed my performance of the services and has documented them in accordance with my direction.    Chandrakant Manzo MD  [facility] EMERGENCY DEPARTMENT  [address] [place] [id number]  [phone]       Chandrakant Manzo MD  06/18/24 0023

## 2024-07-25 ENCOUNTER — APPOINTMENT (OUTPATIENT)
Dept: CT IMAGING | Facility: HOSPITAL | Age: 44
End: 2024-07-25
Attending: EMERGENCY MEDICINE
Payer: MEDICAID

## 2024-07-25 ENCOUNTER — HOSPITAL ENCOUNTER (EMERGENCY)
Facility: HOSPITAL | Age: 44
Discharge: HOME | End: 2024-07-25
Attending: EMERGENCY MEDICINE | Admitting: EMERGENCY MEDICINE
Payer: MEDICAID

## 2024-07-25 VITALS
HEART RATE: 69 BPM | RESPIRATION RATE: 16 BRPM | HEIGHT: 76 IN | SYSTOLIC BLOOD PRESSURE: 108 MMHG | OXYGEN SATURATION: 96 % | DIASTOLIC BLOOD PRESSURE: 76 MMHG | BODY MASS INDEX: 29.83 KG/M2 | TEMPERATURE: 98 F | WEIGHT: 245 LBS

## 2024-07-25 DIAGNOSIS — R10.84 GENERALIZED ABDOMINAL PAIN: ICD-10-CM

## 2024-07-25 DIAGNOSIS — R19.7 DIARRHEA, UNSPECIFIED TYPE: ICD-10-CM

## 2024-07-25 LAB
ALBUMIN SERPL BCG-MCNC: 4.3 G/DL (ref 3.5–5.2)
ALP SERPL-CCNC: 82 U/L (ref 40–150)
ALT SERPL W P-5'-P-CCNC: 28 U/L (ref 0–70)
ANION GAP SERPL CALCULATED.3IONS-SCNC: 10 MMOL/L (ref 7–15)
AST SERPL W P-5'-P-CCNC: 19 U/L (ref 0–45)
BASOPHILS # BLD AUTO: 0.1 10E3/UL (ref 0–0.2)
BASOPHILS NFR BLD AUTO: 1 %
BILIRUB DIRECT SERPL-MCNC: <0.2 MG/DL (ref 0–0.3)
BILIRUB SERPL-MCNC: 0.6 MG/DL
BUN SERPL-MCNC: 11 MG/DL (ref 6–20)
CALCIUM SERPL-MCNC: 9.2 MG/DL (ref 8.8–10.4)
CHLORIDE SERPL-SCNC: 108 MMOL/L (ref 98–107)
CREAT SERPL-MCNC: 1.1 MG/DL (ref 0.67–1.17)
EGFRCR SERPLBLD CKD-EPI 2021: 84 ML/MIN/1.73M2
EOSINOPHIL # BLD AUTO: 0.2 10E3/UL (ref 0–0.7)
EOSINOPHIL NFR BLD AUTO: 3 %
ERYTHROCYTE [DISTWIDTH] IN BLOOD BY AUTOMATED COUNT: 12.8 % (ref 10–15)
GLUCOSE SERPL-MCNC: 127 MG/DL (ref 70–99)
HCO3 SERPL-SCNC: 23 MMOL/L (ref 22–29)
HCT VFR BLD AUTO: 49.3 % (ref 40–53)
HGB BLD-MCNC: 16.8 G/DL (ref 13.3–17.7)
HOLD SPECIMEN: NORMAL
IMM GRANULOCYTES # BLD: 0 10E3/UL
IMM GRANULOCYTES NFR BLD: 0 %
LIPASE SERPL-CCNC: 130 U/L (ref 13–60)
LYMPHOCYTES # BLD AUTO: 1.9 10E3/UL (ref 0.8–5.3)
LYMPHOCYTES NFR BLD AUTO: 27 %
MAGNESIUM SERPL-MCNC: 2.2 MG/DL (ref 1.7–2.3)
MCH RBC QN AUTO: 30.7 PG (ref 26.5–33)
MCHC RBC AUTO-ENTMCNC: 34.1 G/DL (ref 31.5–36.5)
MCV RBC AUTO: 90 FL (ref 78–100)
MONOCYTES # BLD AUTO: 0.5 10E3/UL (ref 0–1.3)
MONOCYTES NFR BLD AUTO: 6 %
NEUTROPHILS # BLD AUTO: 4.4 10E3/UL (ref 1.6–8.3)
NEUTROPHILS NFR BLD AUTO: 62 %
NRBC # BLD AUTO: 0 10E3/UL
NRBC BLD AUTO-RTO: 0 /100
PLATELET # BLD AUTO: 227 10E3/UL (ref 150–450)
POTASSIUM SERPL-SCNC: 4.1 MMOL/L (ref 3.4–5.3)
PROT SERPL-MCNC: 6.7 G/DL (ref 6.4–8.3)
RBC # BLD AUTO: 5.47 10E6/UL (ref 4.4–5.9)
SODIUM SERPL-SCNC: 141 MMOL/L (ref 135–145)
TSH SERPL DL<=0.005 MIU/L-ACNC: 0.65 UIU/ML (ref 0.3–4.2)
WBC # BLD AUTO: 7 10E3/UL (ref 4–11)

## 2024-07-25 PROCEDURE — 83735 ASSAY OF MAGNESIUM: CPT | Performed by: EMERGENCY MEDICINE

## 2024-07-25 PROCEDURE — 83690 ASSAY OF LIPASE: CPT | Performed by: EMERGENCY MEDICINE

## 2024-07-25 PROCEDURE — 99285 EMERGENCY DEPT VISIT HI MDM: CPT | Mod: 25

## 2024-07-25 PROCEDURE — 250N000011 HC RX IP 250 OP 636: Performed by: EMERGENCY MEDICINE

## 2024-07-25 PROCEDURE — 84443 ASSAY THYROID STIM HORMONE: CPT | Performed by: EMERGENCY MEDICINE

## 2024-07-25 PROCEDURE — 74177 CT ABD & PELVIS W/CONTRAST: CPT

## 2024-07-25 PROCEDURE — 36415 COLL VENOUS BLD VENIPUNCTURE: CPT | Performed by: EMERGENCY MEDICINE

## 2024-07-25 PROCEDURE — 80076 HEPATIC FUNCTION PANEL: CPT | Performed by: EMERGENCY MEDICINE

## 2024-07-25 PROCEDURE — 80048 BASIC METABOLIC PNL TOTAL CA: CPT | Performed by: EMERGENCY MEDICINE

## 2024-07-25 PROCEDURE — 85025 COMPLETE CBC W/AUTO DIFF WBC: CPT | Performed by: EMERGENCY MEDICINE

## 2024-07-25 RX ORDER — IOPAMIDOL 755 MG/ML
90 INJECTION, SOLUTION INTRAVASCULAR ONCE
Status: COMPLETED | OUTPATIENT
Start: 2024-07-25 | End: 2024-07-25

## 2024-07-25 RX ADMIN — IOPAMIDOL 90 ML: 755 INJECTION, SOLUTION INTRAVENOUS at 21:22

## 2024-07-25 ASSESSMENT — ENCOUNTER SYMPTOMS
FEVER: 0
VOMITING: 0
ABDOMINAL PAIN: 1
DIARRHEA: 1
COUGH: 0
SHORTNESS OF BREATH: 0
NAUSEA: 0
FREQUENCY: 0

## 2024-07-25 ASSESSMENT — ACTIVITIES OF DAILY LIVING (ADL)
ADLS_ACUITY_SCORE: 35
ADLS_ACUITY_SCORE: 35
ADLS_ACUITY_SCORE: 33

## 2024-07-25 NOTE — Clinical Note
Klaus Seaman was seen and treated in our emergency department on 7/25/2024.  He may return to work on 07/27/2024.  Salvatore can return to work sooner if he is feeling better.     If you have any questions or concerns, please don't hesitate to call.      Alexa Montiel MD

## 2024-07-25 NOTE — ED TRIAGE NOTES
Intermittent diarrhea x3 weeks. Pt waking up with muscle cramps in the middle of the night. Has been drinking Gatorade. Denies fever. Pain across the mid section of his abdomen.

## 2024-07-26 NOTE — ED PROVIDER NOTES
EMERGENCY DEPARTMENT ENCOUNTER      NAME: Klaus Seaman  AGE: 44 year old male  YOB: 1980  MRN: [id number]  EVALUATION DATE & TIME: 7/25/2024  6:45 PM    PCP: Vivian Gonzales    ED PROVIDER: Alexa Montiel M.D.        Chief Complaint   Patient presents with    Abdominal Pain    Diarrhea         FINAL IMPRESSION:    1. Diarrhea, unspecified type    2. Generalized abdominal pain            MEDICAL DECISION MAKING:    Klaus Seaman is a 44 year old male with history of hypertension, headaches, GERD who presents to the ER with complaints of mid abdominal pain and diarrhea.  Diarrhea is been going on now for about 3 weeks.  No bloody stools or fevers.  Imaging unremarkable.  Laboratories and electrolytes unremarkable.  Plan at this time is discharge home to follow-up with primary care if this is persistent.  He was unable to provide a stool study here in the ER.  Outpatient lab order placed for him to drop off a stool study there if his diarrhea persist.  He does not appear toxic or septic.  No indication for admission to the hospital at this time.  Has any recent antibiotic use or recent travel.      ED COURSE:  7:06 PM  I met with the patient to gather history and perform my exam. ED course and treatment discussed.     9:49 PM  Updated patient on all results.  He has not been able to provide a stool sample here in the ER.  Outpatient order has been placed for him to bring it to outpatient lab during business hours.  He will follow-up with family doctor.  Imaging looks good.  Electrolytes look good.  At this time is discharge home.    Imaging looks good.  Laboratories and electrolytes look good.  I do not think this represents rhabdomyolysis.  He does not appear toxic or septic.  At this time I suspect this is more of a left over enteritis type picture that will resolve with time.  Spoke with him about some diet modification to help bulk up his stool.  Encouraged him to continue to stay hydrated like  "he has been doing.  He was unable provide a stool sample here in the ER and an order was placed for him to get 1 to the outpatient lab in the next couple of days.  He understands how to do this and agrees.  He will follow-up with primary care if symptoms persist or return to ER if anything worsens.    I do not think that this represents ACS, PE, ruptured AAA, aortic dissection, bowel obstruction, bowel ischemia, cholecystitis, pancreatitis, appendicitis, diverticulitis, kidney stone, pyelonephritis, incarcerated or strangulated hernia, testicular torsion, viscus perforation, perforated GI ulcer, or other such etiologies at this time.      At the conclusion of the encounter I discussed the results of all of the tests and the disposition. Their questions were answered. The patient (and any family present) acknowledged understanding and were agreeable with the care plan.      CONSULTANTS:  none    MEDICATIONS GIVEN IN THE EMERGENCY:  Medications   iopamidol (ISOVUE-370) solution 90 mL (90 mLs Intravenous $Given 7/25/24 2122)           NEW PRESCRIPTIONS STARTED AT TODAY'S ER VISIT     Medication List      There are no discharge medications for this visit.             CONDITION:  stable        DISPOSITION:  D.c home         =================================================================  =================================================================  TRIAGE ASSESSMENT:  Intermittent diarrhea x3 weeks. Pt waking up with muscle cramps in the middle of the night. Has been drinking Gatorade. Denies fever. Pain across the mid section of his abdomen.        ED Triage Vitals   Enc Vitals Group      BP 07/25/24 1806 121/80      Pulse 07/25/24 1806 86      Resp 07/25/24 1806 16      Temp 07/25/24 1806 98  F (36.7  C)      Temp src --       SpO2 07/25/24 1806 98 %      Weight 07/25/24 1804 111.1 kg (245 lb)      Height 07/25/24 1804 1.93 m (6' 4\")    "       ================================================================  ================================================================    HPI    Patient information was obtained from: patient    Use of Intrepreter: N/A     Klaus Seaman is a 44 year old male with history of anxiety and hypertension who presents to the ER with complaints of middle abdominal pain and muscle cramping.    Patient states he has been having intermittent diarrhea now for about 3 weeks.  Sometimes 3-4 episodes of stooling a day.  Today he has had 2 episodes of diarrhea.  Otherwise denies fevers, cough, chest pain, shortness of breath, urinary symptoms.  He does have some abdominal cramping across the mid abdomen.    Denies any recent travel or antibiotics.  He has been drinking Gatorade to stay hydrated and to get electrolytes.    He has been able to take his medications without difficulty.      CHART REVIEW:  Labs reviewed from his ER visit back in June 17, 2024 for dizziness.  CBC and BMP unremarkable.  COVID, influenza, RSV negative.      REVIEW OF SYSTEMS  Review of Systems   Constitutional:  Negative for fever.   Respiratory:  Negative for cough and shortness of breath.    Cardiovascular:  Negative for chest pain.   Gastrointestinal:  Positive for abdominal pain and diarrhea. Negative for nausea and vomiting.   Genitourinary:  Negative for frequency.   All other systems reviewed and are negative.        PAST MEDICAL HISTORY:  Past Medical History:   Diagnosis Date    CTS (carpal tunnel syndrome)     Hyperlipidemia     Hypertension     Migraines          PAST SURGICAL HISTORY:  Past Surgical History:   Procedure Laterality Date    BACK SURGERY      ENT SURGERY      NASAL SEPTUM SURGERY  03/21/2017    SOFT TISSUE SURGERY      WRIST SURGERY           CURRENT MEDICATIONS:    Prior to Admission medications    Medication Sig Start Date End Date Taking? Authorizing Provider   albuterol (PROAIR HFA/PROVENTIL HFA/VENTOLIN HFA) 108 (90 Base)  MCG/ACT inhaler Inhale 1-2 puffs into the lungs 5/21/20   Reported, Patient   atenolol (TENORMIN) 25 MG tablet Take 1 tablet by mouth At Bedtime 7/30/20 9/12/22  Reported, Patient   DULoxetine (CYMBALTA) 60 MG capsule TAKE 1 CAPSULE BY MOUTH ONCE DAILY 7/23/22   Reported, Patient   loratadine (CLARITIN) 10 MG tablet Take 10 mg by mouth daily    Reported, Patient   nortriptyline (PAMELOR) 10 MG capsule Take 4 capsules (40 mg) by mouth See Admin Instructions 4 tabs po qhs 9/12/22   Yandel Ledesma MD   omega-3 acid ethyl esters (LOVAZA) 1 g capsule Take 2 g by mouth daily    Reported, Patient   SUMAtriptan (IMITREX) 100 MG tablet Take 1 tablet (100 mg) by mouth as needed for migraine 9/12/22   Yandel Ledesma MD   vitamin B-12 (CYANOCOBALAMIN) 2000 MCG tablet Take 2,000 mcg by mouth    Reported, Patient   vitamin D3 (CHOLECALCIFEROL) 50 mcg (2000 units) tablet  7/4/21   Reported, Patient         ALLERGIES:  Allergies   Allergen Reactions    Sulfasalazine Hives    Codeine Other (See Comments)     Childhood  Unable to sleep  Unknown reaction during child campos      Hydrocodone Other (See Comments)     Mood change - angry    Hydrocodone-Acetaminophen      Other reaction(s): *Unknown  Mood swings    Morphine Nausea and Vomiting    Sulfa Antibiotics Hives         FAMILY HISTORY:  Family History   Problem Relation Age of Onset    Diabetes Mother     Lupus Mother     Back Pain Mother     Hypertension Father     Hyperlipidemia Father     Heart Surgery Father     Cancer Father     Coronary Artery Disease Father     Coronary Stenting Father          SOCIAL HISTORY:  Social History     Socioeconomic History    Marital status:    Tobacco Use    Smoking status: Every Day     Current packs/day: 0.50     Average packs/day: 0.5 packs/day for 22.0 years (11.0 ttl pk-yrs)     Types: Cigarettes    Smokeless tobacco: Never   Substance and Sexual Activity    Alcohol use: Yes     Comment: very rare     Drug use:  "Never    Sexual activity: Not Currently   Other Topics Concern    Parent/sibling w/ CABG, MI or angioplasty before 65F 55M? No         VITALS:  Patient Vitals for the past 24 hrs:   BP Temp Pulse Resp SpO2 Height Weight   07/25/24 2100 108/76 -- 69 -- 96 % -- --   07/25/24 2045 113/75 -- 68 -- 97 % -- --   07/25/24 2015 112/75 -- 67 -- 97 % -- --   07/25/24 1945 116/82 -- 70 -- 96 % -- --   07/25/24 1941 -- -- 72 -- 96 % -- --   07/25/24 1940 -- -- 72 -- 96 % -- --   07/25/24 1806 121/80 98  F (36.7  C) 86 16 98 % -- --   07/25/24 1804 -- -- -- -- -- 1.93 m (6' 4\") 111.1 kg (245 lb)       Wt Readings from Last 3 Encounters:   07/25/24 111.1 kg (245 lb)   06/17/24 109.8 kg (242 lb)   09/12/22 110 kg (242 lb 6.4 oz)       Estimated Creatinine Clearance: 115.8 mL/min (based on SCr of 1.1 mg/dL).    PHYSICAL EXAM    Constitutional:  Well developed, Well nourished, NAD  HENT:  Normocephalic, Atraumatic, Bilateral external ears normal, Nose normal. Neck- Supple, No stridor.   Eyes:  PERRL, EOMI, Conjunctiva normal, No discharge.  Respiratory:  Normal breath sounds, No respiratory distress, No wheezing, Speaks full sentences easily. No cough.   Cardiovascular:  Normal heart rate, Regular rhythm, No murmurs, No rubs, No gallops.   GI:  No excessive obesity.  Bowel sounds normal, Soft, +mild mid abd tenderness, No masses, No flank tenderness. No rebound or guarding.  : deferred  Musculoskeletal:  No cyanosis, No clubbing. Good range of motion in all major joints. No tenderness to palpation or major deformities noted.   Integument:  Warm, Dry, No erythema, No rash.  No petechiae.   Neurologic:  Alert & oriented x 3  Psychiatric:  Affect normal, Cooperative       LAB:  All pertinent labs reviewed and interpreted.  Recent Results (from the past 24 hour(s))   Extra Blue Top Tube    Collection Time: 07/25/24  6:15 PM   Result Value Ref Range    Hold Specimen JIC    Extra Red Top Tube    Collection Time: 07/25/24  6:15 PM "   Result Value Ref Range    Hold Specimen JI    Extra Green Top (Lithium Heparin) Tube    Collection Time: 07/25/24  6:15 PM   Result Value Ref Range    Hold Specimen JI    Extra Purple Top Tube    Collection Time: 07/25/24  6:15 PM   Result Value Ref Range    Hold Specimen [facility]    Basic metabolic panel    Collection Time: 07/25/24  6:15 PM   Result Value Ref Range    Sodium 141 135 - 145 mmol/L    Potassium 4.1 3.4 - 5.3 mmol/L    Chloride 108 (H) 98 - 107 mmol/L    Carbon Dioxide (CO2) 23 22 - 29 mmol/L    Anion Gap 10 7 - 15 mmol/L    Urea Nitrogen 11.0 6.0 - 20.0 mg/dL    Creatinine 1.10 0.67 - 1.17 mg/dL    GFR Estimate 84 >60 mL/min/1.73m2    Calcium 9.2 8.8 - 10.4 mg/dL    Glucose 127 (H) 70 - 99 mg/dL   Magnesium    Collection Time: 07/25/24  6:15 PM   Result Value Ref Range    Magnesium 2.2 1.7 - 2.3 mg/dL   Hepatic function panel    Collection Time: 07/25/24  6:15 PM   Result Value Ref Range    Protein Total 6.7 6.4 - 8.3 g/dL    Albumin 4.3 3.5 - 5.2 g/dL    Bilirubin Total 0.6 <=1.2 mg/dL    Alkaline Phosphatase 82 40 - 150 U/L    AST 19 0 - 45 U/L    ALT 28 0 - 70 U/L    Bilirubin Direct <0.20 0.00 - 0.30 mg/dL   Lipase    Collection Time: 07/25/24  6:15 PM   Result Value Ref Range    Lipase 130 (H) 13 - 60 U/L   TSH with free T4 reflex    Collection Time: 07/25/24  6:15 PM   Result Value Ref Range    TSH 0.65 0.30 - 4.20 uIU/mL   CBC with platelets and differential    Collection Time: 07/25/24  6:15 PM   Result Value Ref Range    WBC Count 7.0 4.0 - 11.0 10e3/uL    RBC Count 5.47 4.40 - 5.90 10e6/uL    Hemoglobin 16.8 13.3 - 17.7 g/dL    Hematocrit 49.3 40.0 - 53.0 %    MCV 90 78 - 100 fL    MCH 30.7 26.5 - 33.0 pg    MCHC 34.1 31.5 - 36.5 g/dL    RDW 12.8 10.0 - 15.0 %    Platelet Count 227 150 - 450 10e3/uL    % Neutrophils 62 %    % Lymphocytes 27 %    % Monocytes 6 %    % Eosinophils 3 %    % Basophils 1 %    % Immature Granulocytes 0 %    NRBCs per 100 WBC 0 <1 /100    Absolute Neutrophils 4.4  "1.6 - 8.3 10e3/uL    Absolute Lymphocytes 1.9 0.8 - 5.3 10e3/uL    Absolute Monocytes 0.5 0.0 - 1.3 10e3/uL    Absolute Eosinophils 0.2 0.0 - 0.7 10e3/uL    Absolute Basophils 0.1 0.0 - 0.2 10e3/uL    Absolute Immature Granulocytes 0.0 <=0.4 10e3/uL    Absolute NRBCs 0.0 10e3/uL       No results found for: \"ABORH\"        RADIOLOGY:  Reviewed all pertinent imaging. Please see official radiology report.    CT Abdomen Pelvis w Contrast   Final Result   IMPRESSION:       1.  No acute intra-abdominal inflammatory process.            EKG:    none      PROCEDURES:  none    Medical Decision Making  Obtained supplemental history:Supplemental history obtained?: No  Reviewed external records: External records reviewed?: Documented in chart and Prior Labs: Labs from an ER visit back in June reviewed and are unremarkable including electrolytes and kidney function.  Care impacted by chronic illness:Hypertension  Care significantly affected by social determinants of health:N/A  Did you consider but not order tests?: Work up considered but not performed and documented in chart, if applicable  Did you interpret images independently?: Independent interpretation of ECG and images noted in documentation, when applicable.  Consultation discussion with other provider:Did you involve another provider (consultant, , pharmacy, etc.)?: No  Discharge. No recommendations on prescription strength medication(s). I considered admission, but ultimately discharged patient laboratories and imaging reassuring.  At this time I feel the patient is appropriate for discharge home.    Alexa Montiel M.D. [facility]  Emergency Medicine and Medical Toxicology  Former[facility] EMERGENCY DEPARTMENT  [facility] [facility] [id number] [phone]  Dept: [phone]           Alexa Montiel MD  07/25/24 2155    "

## 2024-07-26 NOTE — DISCHARGE INSTRUCTIONS
The CT scan looks good.  No concerning findings at this time.  It is unclear what is actually leading to your diarrheal symptoms but sometimes it can be a viral illness that can take several weeks to fully resolve.    I would avoid any medications such as Imodium as this can sometimes lead to constipation.    It may be worth trying to stay hydrated with oral fluids and trying the brown rice, applesauce, banana, type diet to help bulk up your stool.    I placed an order for you to bring in a stool study to the outpatient lab in the front of the hospital.  This can be brought in during business hours.  Bring a fresh sample right to the lab within the next 1 week (if the diarrhea continues).    Make an appointment to see family doctor in the next 1-2 weeks if your symptoms are not improving.    Return the emergency department if you develop worsening abdominal pain, worsening diarrhea, blood in the stools, fever over 101 with the stools, or any other concerns.    Thank you for choosing [facility].  It has been my pleasure caring for you today.     ~Dr. Tiana MD

## 2024-08-23 ENCOUNTER — APPOINTMENT (OUTPATIENT)
Dept: CT IMAGING | Facility: HOSPITAL | Age: 44
End: 2024-08-23
Attending: PHYSICIAN ASSISTANT
Payer: COMMERCIAL

## 2024-08-23 ENCOUNTER — HOSPITAL ENCOUNTER (EMERGENCY)
Facility: HOSPITAL | Age: 44
Discharge: HOME | End: 2024-08-23
Admitting: PHYSICIAN ASSISTANT
Payer: COMMERCIAL

## 2024-08-23 ENCOUNTER — APPOINTMENT (OUTPATIENT)
Dept: RADIOLOGY | Facility: HOSPITAL | Age: 44
End: 2024-08-23
Attending: PHYSICIAN ASSISTANT
Payer: COMMERCIAL

## 2024-08-23 VITALS
HEART RATE: 91 BPM | HEIGHT: 76 IN | SYSTOLIC BLOOD PRESSURE: 156 MMHG | RESPIRATION RATE: 25 BRPM | BODY MASS INDEX: 29.38 KG/M2 | DIASTOLIC BLOOD PRESSURE: 100 MMHG | OXYGEN SATURATION: 96 % | WEIGHT: 241.3 LBS | TEMPERATURE: 98 F

## 2024-08-23 DIAGNOSIS — U07.1 COVID-19 VIRUS INFECTION: ICD-10-CM

## 2024-08-23 DIAGNOSIS — J02.9 PHARYNGITIS: ICD-10-CM

## 2024-08-23 DIAGNOSIS — R30.0 DYSURIA: ICD-10-CM

## 2024-08-23 LAB
ALBUMIN UR-MCNC: 10 MG/DL
ANION GAP SERPL CALCULATED.3IONS-SCNC: 12 MMOL/L (ref 7–15)
APPEARANCE UR: CLEAR
BASOPHILS # BLD AUTO: 0 10E3/UL (ref 0–0.2)
BASOPHILS NFR BLD AUTO: 0 %
BILIRUB UR QL STRIP: NEGATIVE
BUN SERPL-MCNC: 12.3 MG/DL (ref 6–20)
CALCIUM SERPL-MCNC: 8.8 MG/DL (ref 8.8–10.4)
CHLORIDE SERPL-SCNC: 103 MMOL/L (ref 98–107)
CK SERPL-CCNC: 134 U/L (ref 39–308)
COLOR UR AUTO: YELLOW
CREAT SERPL-MCNC: 1.1 MG/DL (ref 0.67–1.17)
D DIMER PPP FEU-MCNC: <0.27 UG/ML FEU (ref 0–0.5)
EGFRCR SERPLBLD CKD-EPI 2021: 84 ML/MIN/1.73M2
EOSINOPHIL # BLD AUTO: 0 10E3/UL (ref 0–0.7)
EOSINOPHIL NFR BLD AUTO: 1 %
ERYTHROCYTE [DISTWIDTH] IN BLOOD BY AUTOMATED COUNT: 12.4 % (ref 10–15)
FLUAV RNA SPEC QL NAA+PROBE: NEGATIVE
FLUBV RNA RESP QL NAA+PROBE: NEGATIVE
GLUCOSE SERPL-MCNC: 93 MG/DL (ref 70–99)
GLUCOSE UR STRIP-MCNC: NEGATIVE MG/DL
GROUP A STREP BY PCR: NOT DETECTED
HCO3 SERPL-SCNC: 24 MMOL/L (ref 22–29)
HCT VFR BLD AUTO: 44.3 % (ref 40–53)
HGB BLD-MCNC: 15.5 G/DL (ref 13.3–17.7)
HGB UR QL STRIP: NEGATIVE
HOLD SPECIMEN: NORMAL
IMM GRANULOCYTES # BLD: 0 10E3/UL
IMM GRANULOCYTES NFR BLD: 0 %
KETONES UR STRIP-MCNC: NEGATIVE MG/DL
LEUKOCYTE ESTERASE UR QL STRIP: NEGATIVE
LYMPHOCYTES # BLD AUTO: 1.1 10E3/UL (ref 0.8–5.3)
LYMPHOCYTES NFR BLD AUTO: 15 %
MCH RBC QN AUTO: 31 PG (ref 26.5–33)
MCHC RBC AUTO-ENTMCNC: 35 G/DL (ref 31.5–36.5)
MCV RBC AUTO: 89 FL (ref 78–100)
MONOCYTES # BLD AUTO: 1 10E3/UL (ref 0–1.3)
MONOCYTES NFR BLD AUTO: 13 %
MUCOUS THREADS #/AREA URNS LPF: PRESENT /LPF
NEUTROPHILS # BLD AUTO: 5.4 10E3/UL (ref 1.6–8.3)
NEUTROPHILS NFR BLD AUTO: 71 %
NITRATE UR QL: NEGATIVE
NRBC # BLD AUTO: 0 10E3/UL
NRBC BLD AUTO-RTO: 0 /100
NT-PROBNP SERPL-MCNC: <36 PG/ML (ref 0–450)
PH UR STRIP: 7.5 [PH] (ref 5–7)
PLATELET # BLD AUTO: 148 10E3/UL (ref 150–450)
POTASSIUM SERPL-SCNC: 4.4 MMOL/L (ref 3.4–5.3)
RBC # BLD AUTO: 5 10E6/UL (ref 4.4–5.9)
RBC URINE: <1 /HPF
RSV RNA SPEC NAA+PROBE: NEGATIVE
SARS-COV-2 RNA RESP QL NAA+PROBE: POSITIVE
SODIUM SERPL-SCNC: 139 MMOL/L (ref 135–145)
SP GR UR STRIP: 1.02 (ref 1–1.03)
TRANSITIONAL EPI: <1 /HPF
UROBILINOGEN UR STRIP-MCNC: 4 MG/DL
WBC # BLD AUTO: 7.6 10E3/UL (ref 4–11)
WBC URINE: 1 /HPF

## 2024-08-23 PROCEDURE — 36415 COLL VENOUS BLD VENIPUNCTURE: CPT | Performed by: STUDENT IN AN ORGANIZED HEALTH CARE EDUCATION/TRAINING PROGRAM

## 2024-08-23 PROCEDURE — 250N000013 HC RX MED GY IP 250 OP 250 PS 637: Performed by: STUDENT IN AN ORGANIZED HEALTH CARE EDUCATION/TRAINING PROGRAM

## 2024-08-23 PROCEDURE — 85379 FIBRIN DEGRADATION QUANT: CPT | Performed by: PHYSICIAN ASSISTANT

## 2024-08-23 PROCEDURE — 93005 ELECTROCARDIOGRAM TRACING: CPT | Performed by: STUDENT IN AN ORGANIZED HEALTH CARE EDUCATION/TRAINING PROGRAM

## 2024-08-23 PROCEDURE — 87637 SARSCOV2&INF A&B&RSV AMP PRB: CPT | Performed by: EMERGENCY MEDICINE

## 2024-08-23 PROCEDURE — 71046 X-RAY EXAM CHEST 2 VIEWS: CPT

## 2024-08-23 PROCEDURE — 96361 HYDRATE IV INFUSION ADD-ON: CPT

## 2024-08-23 PROCEDURE — 80048 BASIC METABOLIC PNL TOTAL CA: CPT | Performed by: PHYSICIAN ASSISTANT

## 2024-08-23 PROCEDURE — 258N000003 HC RX IP 258 OP 636: Performed by: PHYSICIAN ASSISTANT

## 2024-08-23 PROCEDURE — 70491 CT SOFT TISSUE NECK W/DYE: CPT

## 2024-08-23 PROCEDURE — 82550 ASSAY OF CK (CPK): CPT | Performed by: PHYSICIAN ASSISTANT

## 2024-08-23 PROCEDURE — 81001 URINALYSIS AUTO W/SCOPE: CPT | Performed by: STUDENT IN AN ORGANIZED HEALTH CARE EDUCATION/TRAINING PROGRAM

## 2024-08-23 PROCEDURE — 99285 EMERGENCY DEPT VISIT HI MDM: CPT | Mod: 25

## 2024-08-23 PROCEDURE — 85004 AUTOMATED DIFF WBC COUNT: CPT | Performed by: PHYSICIAN ASSISTANT

## 2024-08-23 PROCEDURE — 250N000012 HC RX MED GY IP 250 OP 636 PS 637: Performed by: PHYSICIAN ASSISTANT

## 2024-08-23 PROCEDURE — 250N000011 HC RX IP 250 OP 636: Performed by: PHYSICIAN ASSISTANT

## 2024-08-23 PROCEDURE — 83880 ASSAY OF NATRIURETIC PEPTIDE: CPT | Performed by: PHYSICIAN ASSISTANT

## 2024-08-23 PROCEDURE — 96360 HYDRATION IV INFUSION INIT: CPT | Mod: 59

## 2024-08-23 PROCEDURE — 87651 STREP A DNA AMP PROBE: CPT | Performed by: PHYSICIAN ASSISTANT

## 2024-08-23 RX ORDER — IOPAMIDOL 755 MG/ML
90 INJECTION, SOLUTION INTRAVASCULAR ONCE
Status: COMPLETED | OUTPATIENT
Start: 2024-08-23 | End: 2024-08-23

## 2024-08-23 RX ORDER — ACETAMINOPHEN 325 MG/1
650 TABLET ORAL ONCE
Status: COMPLETED | OUTPATIENT
Start: 2024-08-23 | End: 2024-08-23

## 2024-08-23 RX ADMIN — ACETAMINOPHEN 650 MG: 325 TABLET ORAL at 13:20

## 2024-08-23 RX ADMIN — SODIUM CHLORIDE 1000 ML: 9 INJECTION, SOLUTION INTRAVENOUS at 13:49

## 2024-08-23 RX ADMIN — DEXAMETHASONE INTENSOL 10 MG: 1 SOLUTION, CONCENTRATE ORAL at 14:19

## 2024-08-23 RX ADMIN — IOPAMIDOL 90 ML: 755 INJECTION, SOLUTION INTRAVENOUS at 15:59

## 2024-08-23 ASSESSMENT — ACTIVITIES OF DAILY LIVING (ADL)
ADLS_ACUITY_SCORE: 35

## 2024-08-23 ASSESSMENT — COLUMBIA-SUICIDE SEVERITY RATING SCALE - C-SSRS
1. IN THE PAST MONTH, HAVE YOU WISHED YOU WERE DEAD OR WISHED YOU COULD GO TO SLEEP AND NOT WAKE UP?: NO
2. HAVE YOU ACTUALLY HAD ANY THOUGHTS OF KILLING YOURSELF IN THE PAST MONTH?: NO
6. HAVE YOU EVER DONE ANYTHING, STARTED TO DO ANYTHING, OR PREPARED TO DO ANYTHING TO END YOUR LIFE?: NO

## 2024-08-23 NOTE — ED PROVIDER NOTES
Emergency Department Encounter   NAME: Klaus Seaman ; AGE: 44 year old male ; YOB: 1980 ; MRN: [id number] ; PCP: Vivian Gonzales   ED PROVIDER: Marion Thomas PA-C    Evaluation Date & Time:   8/23/2024  1:16 PM    CHIEF COMPLAINT:  Flu Symptoms      Impression and Plan   MDM: Klaus Seaman is a 44 year old male who presents to the ED for evaluation of sore throat, cough, SOB, and dysuria.  The patient presents to the emergency department for evaluation of 3 days of worsening sore throat, cough, shortness of breath as well as urinary dysuria and dark appearing urine.  Upon arrival to the ER, he is afebrile, tachycardic to 110, and hemodynamically stable.  He is clinically well-appearing and in no acute distress.  Breathing comfortably, oxygenation 98% on room air, lungs are clear to auscultation without any wheezing or tightness -nothing to suggest asthma or reactive airway disease.  Considered PE, although patient has no risk factors for such.  As he is low risk, considered PERC, however given his mild tachycardia, D-dimer was obtained which returned within normal limits.  No further PE workup is indicated at this time.  We discussed plan for viral swab, labs, and chest x-ray to further evaluate.  He states that he is primarily concerned about his worsening sore throat and had trouble swallowing his spit earlier, however this has kept down fluids and Tylenol here without difficulty, he is not tripoding or having any difficulty with secretions.  He does have posterior oropharynx erythema and mild edema consistent with pharyngitis type picture but no evidence of tonsillitis or PTA.  Very low clinical suspicion for retropharyngeal abscess, epiglottitis or uveitis though given his description of symptoms we will send him for CT soft tissue of his neck.  Given his urinary symptoms, UA ordered, added on CK to consider rhabdomyolysis.  No reproducible back or CVA tenderness or history of stones.  No evidence  of renal colic.  Low suspicion for obstructing nephrolithiasis, although will assess UA for microscopic hematuria as well as renal function.  Tylenol, Decadron, and IVF ordered for symptomatic relief - suspect viral etiology at this time.     EKG shows sinus tachycardia with heart rate of 103.  No evidence of pathologic arrhythmia or ischemic changes.  No chest pain at this time to suggest ACS.  BNP within normal limits and chest x-ray obtained which showed no evidence of pulmonary congestion.  No concern for CHF.  Chest x-ray without infiltrate or consolidation concerning for pneumonia.  UA shows no evidence of infection.  No reports of urinary retention.  Renal function normal.  UA without microscopic hematuria.  Low suspicion for stones.  Patient declines STI testing and denies any recent exposures.  States that dysuria typically occurs when he is sick.  CK is within normal limits -no evidence of rhabdomyolysis.  Advised increased hydration and follow-up for his urinary symptoms in clinic as no emergent source found at this time.  Basic labs otherwise reassuring.  Strep negative.  COVID-19 did return positive and I suspect that this is the likely source of his symptoms.  CT of his neck was negative for retropharyngeal abscess, deep soft tissue space infection, or epiglottitis.  He has been breathing comfortably, tolerating secretions, passed p.o. challenge in the ED.  I suspect his symptoms are due to pharyngitis in the setting of COVID-19.  At this time, without patient having any concerning comorbidities, do not feel that Paxlovid is warranted.  We discussed supportive measures for home, work note, concerning signs and symptoms to return to the ER.  He verbalized understanding is comfortable with plan.  Discharged home in good condition.      Medical Decision Making  Obtained supplemental history:Supplemental history obtained?: Documented in chart  Reviewed external records: External records reviewed?:  Documented in chart and Inpatient Record: Patient had an ED visit at [facility] ED on 07/25/24  Care impacted by chronic illness:Hyperlipidemia, Hypertension, and Other: migraines and hypercholesterolemia.  Care significantly affected by social determinants of health:N/A  Did you consider but not order tests?: Work up considered but not performed and documented in chart, if applicable  Did you interpret images independently?: Independent interpretation of ECG and images noted in documentation, when applicable.  Consultation discussion with other provider:Did you involve another provider (consultant, , pharmacy, etc.)?: I discussed the care with another health care provider, see documentation for details.  Discharge. No recommendations on prescription strength medication(s). See documentation for any additional details.    ED COURSE:  1:18 PM I met and introduced myself to the patient. I gathered initial history and performed my physical exam. We discussed plan for initial workup.   4:30 PM CXR not read. Called [facility]. They did not receive CXR images. Called Xray rad tech - pushed over images. Confirmed with [facility] - they have images and will place on radiology read list.   6:05 PM I rechecked the patient and discussed results, discharge, follow up, and reasons to return to the ED.     At the conclusion of the encounter I discussed the results of all the tests and the disposition. The questions were answered. The patient or family acknowledged understanding and was agreeable with the care plan.    FINAL IMPRESSION:  No diagnosis found.      MEDICATIONS GIVEN IN THE EMERGENCY DEPARTMENT:  Medications   acetaminophen (TYLENOL) tablet 650 mg (650 mg Oral $Given 8/23/24 1320)         NEW PRESCRIPTIONS STARTED AT TODAY'S ED VISIT:  New Prescriptions    No medications on file         HPI   Use of Intrepreter: N/A     Klaus Seaman is a 44 year old male with a pertinent history of hypertension,  atypical depressive disorder, herniated lumbar disc, GERD, migraine, tobacco use, who presents to the ED by private vehicle for evaluation of sore throat, cough, and shortness of breath.     Patient presents to the emergency department with viral symptoms that started on Tuesday, 3 days ago.  It initially started with a mild sore throat, nasal congestion, postnasal drip that he felt were similar to his allergies, however sore throat has progressed.  He states that he was having trouble swallowing his own spit, however has been able to drink water.  No change in his voice.  No sick contacts that he is aware of.  Has also developed a productive cough and felt short of breath at times.  He is a tobacco user, no asthma history.  He has felt feverish and chilled with myalgias though has not taken his temperature.  He also noticed some mild dysuria on Tuesday which has progressed and he feels that his urine looks darker than normal.  No gross blood.  He does report some ongoing low back pain.  No history of kidney infections or stones.      REVIEW OF SYSTEMS:  Pertinent positive and negative symptoms per HPI.       Medical History     Past Medical History:   Diagnosis Date    CTS (carpal tunnel syndrome)     Hyperlipidemia     Hypertension     Migraines        Past Surgical History:   Procedure Laterality Date    BACK SURGERY      ENT SURGERY      NASAL SEPTUM SURGERY  03/21/2017    SOFT TISSUE SURGERY      WRIST SURGERY         Family History   Problem Relation Age of Onset    Diabetes Mother     Lupus Mother     Back Pain Mother     Hypertension Father     Hyperlipidemia Father     Heart Surgery Father     Cancer Father     Coronary Artery Disease Father     Coronary Stenting Father        Social History     Tobacco Use    Smoking status: Every Day     Current packs/day: 0.50     Average packs/day: 0.5 packs/day for 22.0 years (11.0 ttl pk-yrs)     Types: Cigarettes    Smokeless tobacco: Never   Substance Use Topics     "Alcohol use: Yes     Comment: very rare     Drug use: Never       albuterol (PROAIR HFA/PROVENTIL HFA/VENTOLIN HFA) 108 (90 Base) MCG/ACT inhaler  atenolol (TENORMIN) 25 MG tablet  DULoxetine (CYMBALTA) 60 MG capsule  loratadine (CLARITIN) 10 MG tablet  nortriptyline (PAMELOR) 10 MG capsule  omega-3 acid ethyl esters (LOVAZA) 1 g capsule  SUMAtriptan (IMITREX) 100 MG tablet  vitamin B-12 (CYANOCOBALAMIN) 2000 MCG tablet  vitamin D3 (CHOLECALCIFEROL) 50 mcg (2000 units) tablet          Physical Exam     First Vitals:  Patient Vitals for the past 24 hrs:   BP Temp Temp src Pulse Resp SpO2 Height Weight   08/23/24 1302 (!) 122/90 98.8  F (37.1  C) Temporal 111 25 98 % 1.93 m (6' 4\") 109.5 kg (241 lb 4.8 oz)         PHYSICAL EXAM:   Physical Exam  Vitals reviewed.   Constitutional:       General: He is not in acute distress.     Appearance: He is not ill-appearing, toxic-appearing or diaphoretic.   HENT:      Left Ear: Tympanic membrane and ear canal normal.      Ears:      Comments: Right TM largely obsecured by cerumen though no obvious erythema or bulging. Canal clear.      Mouth/Throat:      Mouth: Mucous membranes are moist.      Comments: Posterior oropharynx erythema with mild edema.  No significant call and tonsillar hypertrophy.  No exudates.  Uvula is midline without deviation and there is no asymmetric soft palate swelling.  Speaking in full sentences with normal phonation.  No trismus.  Handling secretions and breathing comfortably.  No adenopathy or neck fullness.  No stridor.  Cardiovascular:      Rate and Rhythm: Regular rhythm. Tachycardia present.      Heart sounds: Normal heart sounds. No murmur heard.  Pulmonary:      Effort: Pulmonary effort is normal. No respiratory distress.      Breath sounds: Normal breath sounds. No wheezing, rhonchi or rales.   Abdominal:      General: Abdomen is flat. Bowel sounds are normal. There is no distension.      Palpations: Abdomen is soft.      Tenderness: There is " no abdominal tenderness. There is no right CVA tenderness, left CVA tenderness, guarding or rebound.   Musculoskeletal:      Cervical back: Normal range of motion and neck supple. No rigidity.      Comments: No reproducible tenderness to the low back.   Neurological:      Mental Status: He is alert.             Results     LAB:  All pertinent labs reviewed and interpreted  Labs Ordered and Resulted from Time of ED Arrival to Time of ED Departure - No data to display    RADIOLOGY:  No orders to display         ECG:  Performed at: 1:08 PM    Impression: Sinus tachycardia    Rate: 103 BPM  Rhythm: Sinus tachycardia  Axis: 46  ND Interval: 144 ms  QRS Interval: 102 ms  QTc Interval: 442 ms  ST Changes: None  Comparison: No other ECG available.     EKG results reviewed and interpreted by Dr. Givens, ED MD.         Marion Thomas PA-C   Emergency Medicine   [facility] EMERGENCY DEPARTMENT       Marion Thomas PA-C  08/23/24 9918

## 2024-08-23 NOTE — Clinical Note
Klaus Seaman was seen and treated in our emergency department on 8/23/2024.  He may return to work on 08/26/2024.  May return to work on 8/26/2024 as long as he is feeling improved and fever free for 48 hours.      If you have any questions or concerns, please don't hesitate to call.      Marion Thomas PA-C

## 2024-08-23 NOTE — ED TRIAGE NOTES
Patient states he has respiratory symptoms for past 3 days where he is short of breath. Patient states his throat hurts and it is difficult to swallow due to pain. Patient states he is able to swallow. Patient states it is also painful with urination and has lower back pain. Heart rate is tachycardic in triage.     Triage Assessment (Adult)       Row Name 08/23/24 1306          Triage Assessment    Airway WDL WDL        Respiratory WDL    Respiratory WDL X;rhythm/pattern     Rhythm/Pattern, Respiratory shortness of breath        Skin Circulation/Temperature WDL    Skin Circulation/Temperature WDL WDL        Cardiac WDL    Cardiac WDL X;rhythm     Pulse Rate & Regularity tachycardic        Peripheral/Neurovascular WDL    Peripheral Neurovascular WDL WDL        Cognitive/Neuro/Behavioral WDL    Cognitive/Neuro/Behavioral WDL WDL

## 2024-08-23 NOTE — DISCHARGE INSTRUCTIONS
As we discussed, your COVID-19 test returned positive and is the likely source of your symptoms.  Please rest, stay hydrated, and use Tylenol or Motrin as needed for fever or discomfort.  We have given you a dose of steroids to help with your sore throat.  You may also consider using throat lozenges and warm salt water gargles.  If at any point you develop worsening shortness of breath, chest pain, dizziness or loss of consciousness please return to the ER for further evaluation.    For your burning with urination, please follow-up in your primary care clinic to further discuss.  If at any point you develop fever, vomiting, back pain, or any new or concerning symptoms please return to the ER for further evaluation.

## 2024-09-01 LAB
ATRIAL RATE - MUSE: 103 BPM
DIASTOLIC BLOOD PRESSURE - MUSE: NORMAL MMHG
INTERPRETATION ECG - MUSE: NORMAL
P AXIS - MUSE: 47 DEGREES
PR INTERVAL - MUSE: 144 MS
QRS DURATION - MUSE: 102 MS
QT - MUSE: 338 MS
QTC - MUSE: 442 MS
R AXIS - MUSE: 46 DEGREES
SYSTOLIC BLOOD PRESSURE - MUSE: NORMAL MMHG
T AXIS - MUSE: 46 DEGREES
VENTRICULAR RATE- MUSE: 103 BPM

## 2025-06-14 ENCOUNTER — HEALTH MAINTENANCE LETTER (OUTPATIENT)
Age: 45
End: 2025-06-14